# Patient Record
Sex: FEMALE | Race: WHITE | NOT HISPANIC OR LATINO | Employment: OTHER | URBAN - METROPOLITAN AREA
[De-identification: names, ages, dates, MRNs, and addresses within clinical notes are randomized per-mention and may not be internally consistent; named-entity substitution may affect disease eponyms.]

---

## 2017-11-20 LAB
ABO + RH BLD: NORMAL
ABO + RH BLD: NORMAL
BLD GP AB SCN SERPL QL: NORMAL
HBV SURFACE AG SERPL QL IA: NORMAL
HIV 1+2 AB+HIV1 P24 AG SERPL QL IA: NORMAL
RUBELLA ABY IGG: 1.93
RUBELLA ANTIBODY IGG QUANTITATIVE: NORMAL IU/ML
T PALLIDUM IGG SER QL: NORMAL

## 2018-05-18 ENCOUNTER — HOSPITAL ENCOUNTER (OUTPATIENT)
Facility: CLINIC | Age: 28
Discharge: HOME | End: 2018-05-18
Attending: SPECIALIST | Admitting: SPECIALIST
Payer: COMMERCIAL

## 2018-05-18 LAB
ALBUMIN UR-MCNC: NEGATIVE MG/DL
APPEARANCE UR: CLEAR
BACTERIA #/AREA URNS HPF: ABNORMAL /HPF
BILIRUB UR QL STRIP: NEGATIVE
COLOR UR AUTO: ABNORMAL
FIBRONECTIN FETAL VAG QL: NEGATIVE
GLUCOSE UR STRIP-MCNC: NEGATIVE MG/DL
HGB UR QL STRIP: NEGATIVE
KETONES UR STRIP-MCNC: NEGATIVE MG/DL
LEUKOCYTE ESTERASE UR QL STRIP: NEGATIVE
MUCOUS THREADS #/AREA URNS LPF: PRESENT /LPF
NITRATE UR QL: NEGATIVE
PH UR STRIP: 7 PH (ref 5–7)
RBC #/AREA URNS AUTO: 2 /HPF (ref 0–2)
SOURCE: ABNORMAL
SP GR UR STRIP: 1.01 (ref 1–1.03)
SQUAMOUS #/AREA URNS AUTO: 1 /HPF (ref 0–1)
UROBILINOGEN UR STRIP-MCNC: NORMAL MG/DL (ref 0–2)
WBC #/AREA URNS AUTO: 2 /HPF (ref 0–5)

## 2018-05-18 PROCEDURE — 59025 FETAL NON-STRESS TEST: CPT

## 2018-05-18 PROCEDURE — G0463 HOSPITAL OUTPT CLINIC VISIT: HCPCS | Mod: 25

## 2018-05-18 PROCEDURE — 81001 URINALYSIS AUTO W/SCOPE: CPT | Performed by: SPECIALIST

## 2018-05-18 PROCEDURE — 82731 ASSAY OF FETAL FIBRONECTIN: CPT | Performed by: SPECIALIST

## 2018-05-18 RX ORDER — ONDANSETRON 2 MG/ML
4 INJECTION INTRAMUSCULAR; INTRAVENOUS EVERY 6 HOURS PRN
Status: DISCONTINUED | OUTPATIENT
Start: 2018-05-18 | End: 2018-05-18 | Stop reason: HOSPADM

## 2018-05-18 RX ORDER — PRENATAL VIT/IRON FUM/FOLIC AC 27MG-0.8MG
1 TABLET ORAL DAILY
Status: ON HOLD | COMMUNITY
End: 2021-01-14

## 2018-05-18 NOTE — PROGRESS NOTES
1610: Pt presents to [facility] for  labor eval.  Consent given to place external monitors.  Telephone orders from Dr. Can for FFN, SVE, and UA.  1625: FFN collected and sent to lab.  1625: SVE closed, thick and high.  1652: UA collected and sent to lab.  1700: Pt states she had four episodes of sharp lower abdominal pain the car ride over, lasting a few seconds each. Pt states she is not feeling that same pain now that she is here. Reactive NST, occasional ctx noted on monitor.   1730:  Lab results back, FFN negative. Results relayed to Dr. Can. Telephone order to d/c pt home, to orally hydrate, and to rest, and to follow up if symptoms intensify.   1740: Pt agrees with plan of care. D/C home.

## 2018-05-18 NOTE — IP AVS SNAPSHOT
MRN:[id number]                      After Visit Summary   5/18/2018    Stefany Ricardo    MRN: [id number]           Thank you!     Thank you for choosing [place] for your care. Our goal is always to provide you with excellent care. Hearing back from our patients is one way we can continue to improve our services. Please take a few minutes to complete the written survey that you may receive in the mail after you visit with us. Thank you!        Patient Information     Date Of Birth          1990        About your hospital stay     You were admitted on:  May 18, 2018 You last received care in the:  [facility]    You were discharged on:  May 18, 2018       Who to Call     For medical emergencies, please call 911.  For non-urgent questions about your medical care, please call your primary care provider or clinic, None          Attending Provider     Provider Specialty    Yanet Can MD OB/Gyn       Primary Care Provider    None Specified      Further instructions from your care team       Discharge Instruction for Undelivered Patients      You were seen for: Labor Assessment  We Consulted: Dr. Can  You had (Test or Medicine): NST, fetal fibronectin, UA.     Diet:   Drink 8 to 12 glasses of liquids (milk, juice, water) every day.  You may eat meals and snacks.     Activity:  Call your doctor or nurse midwife if your baby is moving less than usual.     Call your provider if you notice:  Swelling in your face or increased swelling in your hands or legs.  Headaches that are not relieved by Tylenol (acetaminophen).  Changes in your vision (blurring: seeing spots or stars.)  Nausea (sick to your stomach) and vomiting (throwing up).   Weight gain of 5 pounds or more per week.  Heartburn that doesn't go away.  Signs of bladder infection: pain when you urinate (use the toilet), need to go more often and more urgently.  The bag of arredondo (rupture of membranes) breaks, or you notice  "leaking in your underwear.  Bright red blood in your underwear.  Abdominal (lower belly) or stomach pain.  For first baby: Contractions (tightening) less than 5 minutes apart for one hour or more.  *If less than 34 weeks: Contractions (tightenings) more than 6 times in one hour.  Increase or change in vaginal discharge (note the color and amount)    Follow-up:  As scheduled in the clinic, or if symptoms get worse.          Pending Results     No orders found from 2018 to 2018.            Admission Information     Date & Time Provider Department Dept. Phone    2018 Yanet Can MD [facility] [phone]      [facility] Information     [facility] lets you send messages to your doctor, view your test results, renew your prescriptions, schedule appointments and more. To sign up, go to [email or web address] . Click on \"Log in\" on the left side of the screen, which will take you to the Welcome page. Then click on \"Sign up Now\" on the right side of the page.     You will be asked to enter the access code listed below, as well as some personal information. Please follow the directions to create your username and password.     Your access code is: [email or web address]  Expires: 2018  5:41 PM     Your access code will  in 90 days. If you need help or a new code, please call your [place] clinic or [phone].        Care EveryWhere ID     This is your Care EveryWhere ID. This could be used by other organizations to access your [place] medical records  [id number]        Equal Access to Services     [facility]: Hadii aad ku hadashandriy Sodarlyn, waaxda luqadaha, qaybta kaalmada andrew, bj gonzales . So [facility] [phone].    ATENCIÓN: Si habla español, tiene a corrales disposición servicios gratuitos de asistencia lingüística. Llame al [phone].    We comply with applicable federal civil rights laws and [place] laws. We do not discriminate on the basis of " race, color, national origin, age, disability, sex, sexual orientation, or gender identity.               Review of your medicines      UNREVIEWED medicines. Ask your doctor about these medicines        Dose / Directions    prenatal multivitamin plus iron 27-0.8 MG Tabs per tablet        Dose:  1 tablet   Take 1 tablet by mouth daily   Refills:  0                Protect others around you: Learn how to safely use, store and throw away your medicines at www.disposemymeds.org.             Medication List: This is a list of all your medications and when to take them. Check marks below indicate your daily home schedule. Keep this list as a reference.      Medications           Morning Afternoon Evening Bedtime As Needed    prenatal multivitamin plus iron 27-0.8 MG Tabs per tablet   Take 1 tablet by mouth daily

## 2018-05-18 NOTE — DISCHARGE INSTRUCTIONS
Discharge Instruction for Undelivered Patients      You were seen for: Labor Assessment  We Consulted: Dr. Can  You had (Test or Medicine): NST, fetal fibronectin, UA.     Diet:   Drink 8 to 12 glasses of liquids (milk, juice, water) every day.  You may eat meals and snacks.     Activity:  Call your doctor or nurse midwife if your baby is moving less than usual.     Call your provider if you notice:  Swelling in your face or increased swelling in your hands or legs.  Headaches that are not relieved by Tylenol (acetaminophen).  Changes in your vision (blurring: seeing spots or stars.)  Nausea (sick to your stomach) and vomiting (throwing up).   Weight gain of 5 pounds or more per week.  Heartburn that doesn't go away.  Signs of bladder infection: pain when you urinate (use the toilet), need to go more often and more urgently.  The bag of arredondo (rupture of membranes) breaks, or you notice leaking in your underwear.  Bright red blood in your underwear.  Abdominal (lower belly) or stomach pain.  For first baby: Contractions (tightening) less than 5 minutes apart for one hour or more.  *If less than 34 weeks: Contractions (tightenings) more than 6 times in one hour.  Increase or change in vaginal discharge (note the color and amount)    Follow-up:  As scheduled in the clinic, or if symptoms get worse.

## 2018-05-18 NOTE — IP AVS SNAPSHOT
[address], [address]    [facility] [address]    Phone:  [phone]                                       After Visit Summary   5/18/2018    Stefany Ricardo    MRN: [id number]           After Visit Summary Signature Page     I have received my discharge instructions, and my questions have been answered. I have discussed any challenges I see with this plan with the nurse or doctor.    ..........................................................................................................................................  Patient/Patient Representative Signature      ..........................................................................................................................................  Patient Representative Print Name and Relationship to Patient    ..................................................               ................................................  Date                                            Time    ..........................................................................................................................................  Reviewed by Signature/Title    ...................................................              ..............................................  Date                                                            Time

## 2018-06-05 LAB — GROUP B STREP PCR: NORMAL

## 2018-06-22 ENCOUNTER — HOSPITAL ENCOUNTER (OUTPATIENT)
Facility: CLINIC | Age: 28
Discharge: HOME | End: 2018-06-22
Attending: SPECIALIST | Admitting: SPECIALIST
Payer: COMMERCIAL

## 2018-06-22 VITALS
RESPIRATION RATE: 16 BRPM | BODY MASS INDEX: 29.71 KG/M2 | DIASTOLIC BLOOD PRESSURE: 80 MMHG | HEART RATE: 90 BPM | SYSTOLIC BLOOD PRESSURE: 103 MMHG | WEIGHT: 174 LBS | TEMPERATURE: 99.4 F | HEIGHT: 64 IN

## 2018-06-22 LAB — RUPTURE OF FETAL MEMBRANES BY ROM PLUS: NEGATIVE

## 2018-06-22 PROCEDURE — 84112 EVAL AMNIOTIC FLUID PROTEIN: CPT | Performed by: SPECIALIST

## 2018-06-22 PROCEDURE — 59025 FETAL NON-STRESS TEST: CPT

## 2018-06-22 PROCEDURE — G0463 HOSPITAL OUTPT CLINIC VISIT: HCPCS | Mod: 25

## 2018-06-22 PROCEDURE — G0463 HOSPITAL OUTPT CLINIC VISIT: HCPCS

## 2018-06-22 RX ORDER — CALCIUM POLYCARBOPHIL 625 MG 625 MG/1
2 TABLET ORAL DAILY
COMMUNITY
End: 2020-05-20

## 2018-06-22 NOTE — IP AVS SNAPSHOT
MRN:[id number]                      After Visit Summary   6/22/2018    Stefany Ricadro    MRN: [id number]           Thank you!     Thank you for choosing [facility] for your care. Our goal is always to provide you with excellent care. Hearing back from our patients is one way we can continue to improve our services. Please take a few minutes to complete the written survey that you may receive in the mail after you visit with us. Thank you!        Patient Information     Date Of Birth          1990        About your hospital stay     You were admitted on:  June 22, 2018 You [facility] received care in the:   [facility]    You were discharged on:  June 22, 2018       Who to Call     For medical emergencies, please call 911.  For non-urgent questions about your medical care, please call your primary care provider or clinic, [phone]          Attending Provider     Provider Peggy Almazan MD OB/Gyn       Primary Care Provider Office Phone # Fax #    Yanet Michell Can [phone]      Further instructions from your care team       Discharge Instruction for Undelivered Patients      You were seen for: Membrane Assessment  We Consulted: Dr. Santos  You had (Test or Medicine):ROM test, Results Not Ruptured     Diet:   Drink 8 to 12 glasses of liquids (milk, juice, water) every day.  You may eat meals and snacks.     Activity:  Count fetal kicks everyday (see handout)  Call your doctor or nurse midwife if your baby is moving less than usual.     Call your provider if you notice:  Swelling in your face or increased swelling in your hands or legs.  Headaches that are not relieved by Tylenol (acetaminophen).  Changes in your vision (blurring: seeing spots or stars.)  Nausea (sick to your stomach) and vomiting (throwing up).   Weight gain of 5 pounds or more per week.  Heartburn that doesn't go away.  Signs of bladder infection: pain when you urinate (use the  "toilet), need to go more often and more urgently.  The bag of arredondo (rupture of membranes) breaks, or you notice leaking in your underwear.  Bright red blood in your underwear.  Abdominal (lower belly) or stomach pain.  For first baby: Contractions (tightening) less than 5 minutes apart for one hour or more.  Increase or change in vaginal discharge (note the color and amount)    Follow-up:  As scheduled in the clinic          Pending Results     No orders found from 2018 to 2018.            Admission Information     Date & Time Provider Department Dept. Phone    2018 Peggy Santos MD  [facility] [phone]      Your Vitals Were     Blood Pressure Pulse Temperature Respirations Height Weight    103/80 90 99.4  F (37.4  C) (Oral) 16 1.626 m (5' 4\") 78.9 kg (174 lb)    BMI (Body Mass Index)                   29.87 kg/m2           MyChart Information     [employer] lets you send messages to your doctor, view your test results, renew your prescriptions, schedule appointments and more. To sign up, go to [email or web address] . Click on \"Log in\" on the left side of the screen, which will take you to the Welcome page. Then click on \"Sign up Now\" on the right side of the page.     You will be asked to enter the access code listed below, as well as some personal information. Please follow the directions to create your username and password.     Your access code is: [email or web address]  Expires: 2018  5:41 PM     Your access code will  in 90 days. If you need help or a new code, please call your [place] clinic or [phone].        Care EveryWhere ID     This is your Care EveryWhere ID. This could be used by other organizations to access your [place] medical records  [id number]        Equal Access to Services     [facility] : Jamie Man, ania henderson, bj dooley. So [facility] [phone].    ATENCIÓN: Si " trey castro, tiene a corrales disposición servicios gratuitos de asistencia lingüística. Khai ennis [phone].    We comply with applicable federal civil rights laws and [place] laws. We do not discriminate on the basis of race, color, national origin, age, disability, sex, sexual orientation, or gender identity.               Review of your medicines      UNREVIEWED medicines. Ask your doctor about these medicines        Dose / Directions    calcium polycarbophil 625 MG tablet   Commonly known as:  FIBERCON        Dose:  2 tablet   Take 2 tablets by mouth daily   Refills:  0       prenatal multivitamin plus iron 27-0.8 MG Tabs per tablet        Dose:  1 tablet   Take 1 tablet by mouth daily   Refills:  0       VITAMIN D (CHOLECALCIFEROL) PO        Dose:  5000 Units   Take 5,000 Units by mouth daily   Refills:  0                Protect others around you: Learn how to safely use, store and throw away your medicines at www.disposemymeds.org.             Medication List: This is a list of all your medications and when to take them. Check marks below indicate your daily home schedule. Keep this list as a reference.      Medications           Morning Afternoon Evening Bedtime As Needed    calcium polycarbophil 625 MG tablet   Commonly known as:  FIBERCON   Take 2 tablets by mouth daily                                prenatal multivitamin plus iron 27-0.8 MG Tabs per tablet   Take 1 tablet by mouth daily                                VITAMIN D (CHOLECALCIFEROL) PO   Take 5,000 Units by mouth daily

## 2018-06-22 NOTE — DISCHARGE INSTRUCTIONS
Discharge Instruction for Undelivered Patients      You were seen for: Membrane Assessment  We Consulted: Dr. Santos  You had (Test or Medicine):ROM test, Results Not Ruptured     Diet:   Drink 8 to 12 glasses of liquids (milk, juice, water) every day.  You may eat meals and snacks.     Activity:  Count fetal kicks everyday (see handout)  Call your doctor or nurse midwife if your baby is moving less than usual.     Call your provider if you notice:  Swelling in your face or increased swelling in your hands or legs.  Headaches that are not relieved by Tylenol (acetaminophen).  Changes in your vision (blurring: seeing spots or stars.)  Nausea (sick to your stomach) and vomiting (throwing up).   Weight gain of 5 pounds or more per week.  Heartburn that doesn't go away.  Signs of bladder infection: pain when you urinate (use the toilet), need to go more often and more urgently.  The bag of arredondo (rupture of membranes) breaks, or you notice leaking in your underwear.  Bright red blood in your underwear.  Abdominal (lower belly) or stomach pain.  For first baby: Contractions (tightening) less than 5 minutes apart for one hour or more.  Increase or change in vaginal discharge (note the color and amount)    Follow-up:  As scheduled in the clinic

## 2018-06-22 NOTE — PLAN OF CARE
At arrived to Maternal assessment center at 1245 and put into room 229. Zena and Us applied. Assessment started. Pt stated has been feeling wet vaginally at times but mo large gushes.  No blood noted.  Pt states feels baby move regularly.  At 1310 - 1340 NST done.  ROM test came back negative.  Pt informed of results.  SVE done at this time as well. At 1420 Dr. Santos called and updated on maternal status and fht's and ROm test being negative.  Pt at [facility] was discharged.  All Discharge instructions gone over with patient.

## 2018-06-22 NOTE — IP AVS SNAPSHOT
[facility]    [address] [place] [address], [address]    [facility] [address]    Phone:  [phone]                                       After Visit Summary   6/22/2018    Stefany Ricardo    MRN: [id number]           After Visit Summary Signature Page     I have received my discharge instructions, and my questions have been answered. I have discussed any challenges I see with this plan with the nurse or doctor.    ..........................................................................................................................................  Patient/Patient Representative Signature      ..........................................................................................................................................  Patient Representative Print Name and Relationship to Patient    ..................................................               ................................................  Date                                            Time    ..........................................................................................................................................  Reviewed by Signature/Title    ...................................................              ..............................................  Date                                                            Time

## 2018-07-06 ENCOUNTER — HOSPITAL ENCOUNTER (OUTPATIENT)
Facility: CLINIC | Age: 28
Setting detail: OBSERVATION
Discharge: HOME | End: 2018-07-07
Attending: SPECIALIST | Admitting: SPECIALIST
Payer: COMMERCIAL

## 2018-07-06 LAB
ALT SERPL W P-5'-P-CCNC: 15 U/L (ref 0–50)
AST SERPL W P-5'-P-CCNC: 16 U/L (ref 0–45)
CREAT SERPL-MCNC: 0.7 MG/DL (ref 0.52–1.04)
ERYTHROCYTE [DISTWIDTH] IN BLOOD BY AUTOMATED COUNT: 13 % (ref 10–15)
GFR SERPL CREATININE-BSD FRML MDRD: >90 ML/MIN/1.7M2
HCT VFR BLD AUTO: 38.1 % (ref 35–47)
HGB BLD-MCNC: 13.5 G/DL (ref 11.7–15.7)
MCH RBC QN AUTO: 31 PG (ref 26.5–33)
MCHC RBC AUTO-ENTMCNC: 35.4 G/DL (ref 31.5–36.5)
MCV RBC AUTO: 87 FL (ref 78–100)
PLATELET # BLD AUTO: 175 10E9/L (ref 150–450)
RBC # BLD AUTO: 4.36 10E12/L (ref 3.8–5.2)
URATE SERPL-MCNC: 4.4 MG/DL (ref 2.6–6)
WBC # BLD AUTO: 15.4 10E9/L (ref 4–11)

## 2018-07-06 PROCEDURE — 25000132 ZZH RX MED GY IP 250 OP 250 PS 637: Performed by: SPECIALIST

## 2018-07-06 PROCEDURE — 36415 COLL VENOUS BLD VENIPUNCTURE: CPT | Performed by: SPECIALIST

## 2018-07-06 PROCEDURE — G0378 HOSPITAL OBSERVATION PER HR: HCPCS

## 2018-07-06 PROCEDURE — 85027 COMPLETE CBC AUTOMATED: CPT | Performed by: SPECIALIST

## 2018-07-06 PROCEDURE — 25000128 H RX IP 250 OP 636: Performed by: SPECIALIST

## 2018-07-06 PROCEDURE — G0463 HOSPITAL OUTPT CLINIC VISIT: HCPCS | Mod: 25

## 2018-07-06 PROCEDURE — 82565 ASSAY OF CREATININE: CPT | Performed by: SPECIALIST

## 2018-07-06 PROCEDURE — 84450 TRANSFERASE (AST) (SGOT): CPT | Performed by: SPECIALIST

## 2018-07-06 PROCEDURE — 86780 TREPONEMA PALLIDUM: CPT | Performed by: SPECIALIST

## 2018-07-06 PROCEDURE — 59025 FETAL NON-STRESS TEST: CPT

## 2018-07-06 PROCEDURE — 96372 THER/PROPH/DIAG INJ SC/IM: CPT

## 2018-07-06 PROCEDURE — 84460 ALANINE AMINO (ALT) (SGPT): CPT | Performed by: SPECIALIST

## 2018-07-06 PROCEDURE — 84550 ASSAY OF BLOOD/URIC ACID: CPT | Performed by: SPECIALIST

## 2018-07-06 RX ORDER — ONDANSETRON 2 MG/ML
4 INJECTION INTRAMUSCULAR; INTRAVENOUS EVERY 6 HOURS PRN
Status: DISCONTINUED | OUTPATIENT
Start: 2018-07-06 | End: 2018-07-07 | Stop reason: HOSPADM

## 2018-07-06 RX ORDER — HYDROXYZINE HYDROCHLORIDE 50 MG/1
100 TABLET, FILM COATED ORAL ONCE
Status: COMPLETED | OUTPATIENT
Start: 2018-07-06 | End: 2018-07-06

## 2018-07-06 RX ORDER — MORPHINE SULFATE 10 MG/ML
10 INJECTION, SOLUTION INTRAMUSCULAR; INTRAVENOUS ONCE
Status: COMPLETED | OUTPATIENT
Start: 2018-07-06 | End: 2018-07-06

## 2018-07-06 RX ADMIN — HYDROXYZINE HYDROCHLORIDE 100 MG: 50 TABLET, FILM COATED ORAL at 23:33

## 2018-07-06 RX ADMIN — MORPHINE SULFATE 10 MG: 10 INJECTION, SOLUTION INTRAMUSCULAR; INTRAVENOUS at 23:33

## 2018-07-06 NOTE — IP AVS SNAPSHOT
MRN:[id number]                      After Visit Summary   7/6/2018    Stefany Ricardo    MRN: [id number]           Thank you!     Thank you for choosing [facility] for your care. Our goal is always to provide you with excellent care. Hearing back from our patients is one way we can continue to improve our services. Please take a few minutes to complete the written survey that you may receive in the mail after you visit with us. Thank you!        Patient Information     Date Of Birth          1990        About your hospital stay     You were admitted on:  July 6, 2018 You last received care in the:  [facility] Labor [facility] Delivery    You were discharged on:  July 7, 2018       Who to Call     For medical emergencies, please call 911.  For non-urgent questions about your medical care, please call your primary care provider or clinic, [phone]          Attending Provider     Provider Specialty    Geraldine Diane MD OB/Gyn       Primary Care Provider Office Phone # Fax #    Yanet Michell Can [phone]      Further instructions from your care team       Discharge Instruction for Undelivered Patients      You were seen for: Labor Assessment  We Consulted: Dr. Diane  You had (Test or Medicine): Non Stress Test, Lab work, Vistaril, Morphine     Diet:   Drink 8 to 12 glasses of liquids (milk, juice, water) every day.  You may eat meals and snacks.     Activity:  Count fetal kicks everyday (see handout)  Call your doctor or nurse midwife if your baby is moving less than usual.     Call your provider if you notice:  Swelling in your face or increased swelling in your hands or legs.  Headaches that are not relieved by Tylenol (acetaminophen).  Changes in your vision (blurring: seeing spots or stars.)  Nausea (sick to your stomach) and vomiting (throwing up).   Weight gain of 5 pounds or more per week.  Heartburn that doesn't go away.  Signs of bladder infection: pain  "when you urinate (use the toilet), need to go more often and more urgently.  The bag of arredondo (rupture of membranes) breaks, or you notice leaking in your underwear.  Bright red blood in your underwear.  Abdominal (lower belly) or stomach pain.  For first baby: Contractions (tightening) less than 5 minutes apart for one hour or more.  Second (plus) baby: Contractions (tightening) less than 10 minutes apart and getting stronger.  *If less than 34 weeks: Contractions (tightenings) more than 6 times in one hour.  Increase or change in vaginal discharge (note the color and amount)      Follow-up:  As scheduled in the clinic          Pending Results     Date and Time Order Name Status Description    2018 2259 Treponema Abs w Reflex to RPR and Titer In process             Admission Information     Date & Time Provider Department Dept. Phone    2018 Geraldine Diane MD [facility] Labor and Delivery [phone]      Your Vitals Were     Blood Pressure Temperature Respirations             120/82 99.3  F (37.4  C) 16         MyChart Information     [facility] [employer] lets you send messages to your doctor, view your test results, renew your prescriptions, schedule appointments and more. To sign up, go to [email or web address] . Click on \"Log in\" on the left side of the screen, which will take you to the Welcome page. Then click on \"Sign up Now\" on the right side of the page.     You will be asked to enter the access code listed below, as well as some personal information. Please follow the directions to create your username and password.     Your access code is: [email or web address]  Expires: 2018  5:41 PM     Your access code will  in 90 days. If you need help or a new code, please call your [place] clinic or [phone].        Care EveryWhere ID     This is your Care EveryWhere ID. This could be used by other organizations to access your [place] medical records  [id number]        Equal Access to " Services     [facility]: Hadii kingsley Man, waaxda luqadaha, qaybta kaalmada andrew, bj gonzales . So [facility] [phone].    ATENCIÓN: Si habla español, tiene a corrales disposición servicios gratuitos de asistencia lingüística. Llame al [phone].    We comply with applicable federal civil rights laws and [place] laws. We do not discriminate on the basis of race, color, national origin, age, disability, sex, sexual orientation, or gender identity.               Review of your medicines      UNREVIEWED medicines. Ask your doctor about these medicines        Dose / Directions    calcium polycarbophil 625 MG tablet   Commonly known as:  FIBERCON        Dose:  2 tablet   Take 2 tablets by mouth daily   Refills:  0       prenatal multivitamin plus iron 27-0.8 MG Tabs per tablet        Dose:  1 tablet   Take 1 tablet by mouth daily   Refills:  0       VITAMIN D (CHOLECALCIFEROL) PO        Dose:  5000 Units   Take 5,000 Units by mouth daily   Refills:  0                Protect others around you: Learn how to safely use, store and throw away your medicines at www.disposemymeds.org.             Medication List: This is a list of all your medications and when to take them. Check marks below indicate your daily home schedule. Keep this list as a reference.      Medications           Morning Afternoon Evening Bedtime As Needed    calcium polycarbophil 625 MG tablet   Commonly known as:  FIBERCON   Take 2 tablets by mouth daily                                prenatal multivitamin plus iron 27-0.8 MG Tabs per tablet   Take 1 tablet by mouth daily                                VITAMIN D (CHOLECALCIFEROL) PO   Take 5,000 Units by mouth daily

## 2018-07-06 NOTE — IP AVS SNAPSHOT
[facility] Labor and Delivery    [address] [place] [address]    Phone:  [phone]                                       After Visit Summary   7/6/2018    Stefany Ricardo    MRN: [id number]           After Visit Summary Signature Page     I have received my discharge instructions, and my questions have been answered. I have discussed any challenges I see with this plan with the nurse or doctor.    ..........................................................................................................................................  Patient/Patient Representative Signature      ..........................................................................................................................................  Patient Representative Print Name and Relationship to Patient    ..................................................               ................................................  Date                                            Time    ..........................................................................................................................................  Reviewed by Signature/Title    ...................................................              ..............................................  Date                                                            Time

## 2018-07-07 ENCOUNTER — HOSPITAL ENCOUNTER (INPATIENT)
Facility: CLINIC | Age: 28
LOS: 3 days | Discharge: HOME | End: 2018-07-10
Attending: SPECIALIST | Admitting: SPECIALIST
Payer: COMMERCIAL

## 2018-07-07 ENCOUNTER — ANESTHESIA (OUTPATIENT)
Dept: OBGYN | Facility: CLINIC | Age: 28
End: 2018-07-07
Payer: COMMERCIAL

## 2018-07-07 ENCOUNTER — ANESTHESIA EVENT (OUTPATIENT)
Dept: OBGYN | Facility: CLINIC | Age: 28
End: 2018-07-07
Payer: COMMERCIAL

## 2018-07-07 VITALS — DIASTOLIC BLOOD PRESSURE: 86 MMHG | RESPIRATION RATE: 16 BRPM | TEMPERATURE: 99.3 F | SYSTOLIC BLOOD PRESSURE: 127 MMHG

## 2018-07-07 DIAGNOSIS — F41.9 ANXIETY: Primary | ICD-10-CM

## 2018-07-07 LAB
ABO + RH BLD: NORMAL
ABO + RH BLD: NORMAL
RUPTURE OF FETAL MEMBRANES BY ROM PLUS: POSITIVE
SPECIMEN EXP DATE BLD: NORMAL
T PALLIDUM AB SER QL: NONREACTIVE

## 2018-07-07 PROCEDURE — 3E0R3BZ INTRODUCTION OF ANESTHETIC AGENT INTO SPINAL CANAL, PERCUTANEOUS APPROACH: ICD-10-PCS | Performed by: ANESTHESIOLOGY

## 2018-07-07 PROCEDURE — 25000125 ZZHC RX 250: Performed by: SPECIALIST

## 2018-07-07 PROCEDURE — 27110038 ZZH RX 271: Performed by: ANESTHESIOLOGY

## 2018-07-07 PROCEDURE — 25000125 ZZHC RX 250: Performed by: ANESTHESIOLOGY

## 2018-07-07 PROCEDURE — 12000029 ZZH R&B OB INTERMEDIATE

## 2018-07-07 PROCEDURE — 86901 BLOOD TYPING SEROLOGIC RH(D): CPT | Performed by: SPECIALIST

## 2018-07-07 PROCEDURE — G0378 HOSPITAL OBSERVATION PER HR: HCPCS

## 2018-07-07 PROCEDURE — 37000011 ZZH ANESTHESIA WARD SERVICE

## 2018-07-07 PROCEDURE — G0463 HOSPITAL OUTPT CLINIC VISIT: HCPCS

## 2018-07-07 PROCEDURE — 25000128 H RX IP 250 OP 636: Performed by: ANESTHESIOLOGY

## 2018-07-07 PROCEDURE — 59025 FETAL NON-STRESS TEST: CPT

## 2018-07-07 PROCEDURE — 86780 TREPONEMA PALLIDUM: CPT | Performed by: SPECIALIST

## 2018-07-07 PROCEDURE — 86900 BLOOD TYPING SEROLOGIC ABO: CPT | Performed by: SPECIALIST

## 2018-07-07 PROCEDURE — 36415 COLL VENOUS BLD VENIPUNCTURE: CPT | Performed by: SPECIALIST

## 2018-07-07 PROCEDURE — 84112 EVAL AMNIOTIC FLUID PROTEIN: CPT | Performed by: SPECIALIST

## 2018-07-07 PROCEDURE — 00HU33Z INSERTION OF INFUSION DEVICE INTO SPINAL CANAL, PERCUTANEOUS APPROACH: ICD-10-PCS | Performed by: ANESTHESIOLOGY

## 2018-07-07 PROCEDURE — 25000128 H RX IP 250 OP 636: Performed by: SPECIALIST

## 2018-07-07 RX ORDER — LIDOCAINE HYDROCHLORIDE AND EPINEPHRINE 15; 5 MG/ML; UG/ML
3 INJECTION, SOLUTION EPIDURAL
Status: COMPLETED | OUTPATIENT
Start: 2018-07-07 | End: 2018-07-07

## 2018-07-07 RX ORDER — OXYTOCIN/0.9 % SODIUM CHLORIDE 30/500 ML
1-24 PLASTIC BAG, INJECTION (ML) INTRAVENOUS CONTINUOUS
Status: DISCONTINUED | OUTPATIENT
Start: 2018-07-07 | End: 2018-07-08

## 2018-07-07 RX ORDER — LIDOCAINE 40 MG/G
CREAM TOPICAL
Status: DISCONTINUED | OUTPATIENT
Start: 2018-07-07 | End: 2018-07-08

## 2018-07-07 RX ORDER — OXYTOCIN 10 [USP'U]/ML
10 INJECTION, SOLUTION INTRAMUSCULAR; INTRAVENOUS
Status: DISCONTINUED | OUTPATIENT
Start: 2018-07-07 | End: 2018-07-08

## 2018-07-07 RX ORDER — METHYLERGONOVINE MALEATE 0.2 MG/ML
200 INJECTION INTRAVENOUS
Status: DISCONTINUED | OUTPATIENT
Start: 2018-07-07 | End: 2018-07-08

## 2018-07-07 RX ORDER — OXYTOCIN/0.9 % SODIUM CHLORIDE 30/500 ML
100-340 PLASTIC BAG, INJECTION (ML) INTRAVENOUS CONTINUOUS PRN
Status: COMPLETED | OUTPATIENT
Start: 2018-07-07 | End: 2018-07-08

## 2018-07-07 RX ORDER — NALBUPHINE HYDROCHLORIDE 10 MG/ML
2.5-5 INJECTION, SOLUTION INTRAMUSCULAR; INTRAVENOUS; SUBCUTANEOUS EVERY 6 HOURS PRN
Status: DISCONTINUED | OUTPATIENT
Start: 2018-07-07 | End: 2018-07-08

## 2018-07-07 RX ORDER — OXYCODONE AND ACETAMINOPHEN 5; 325 MG/1; MG/1
1 TABLET ORAL
Status: DISCONTINUED | OUTPATIENT
Start: 2018-07-07 | End: 2018-07-08

## 2018-07-07 RX ORDER — ROPIVACAINE HYDROCHLORIDE 2 MG/ML
10 INJECTION, SOLUTION EPIDURAL; INFILTRATION; PERINEURAL ONCE
Status: COMPLETED | OUTPATIENT
Start: 2018-07-07 | End: 2018-07-07

## 2018-07-07 RX ORDER — ACETAMINOPHEN 325 MG/1
650 TABLET ORAL EVERY 4 HOURS PRN
Status: DISCONTINUED | OUTPATIENT
Start: 2018-07-07 | End: 2018-07-08

## 2018-07-07 RX ORDER — SODIUM CHLORIDE, SODIUM LACTATE, POTASSIUM CHLORIDE, CALCIUM CHLORIDE 600; 310; 30; 20 MG/100ML; MG/100ML; MG/100ML; MG/100ML
INJECTION, SOLUTION INTRAVENOUS CONTINUOUS
Status: DISCONTINUED | OUTPATIENT
Start: 2018-07-07 | End: 2018-07-08

## 2018-07-07 RX ORDER — EPHEDRINE SULFATE 50 MG/ML
5 INJECTION, SOLUTION INTRAMUSCULAR; INTRAVENOUS; SUBCUTANEOUS
Status: DISCONTINUED | OUTPATIENT
Start: 2018-07-07 | End: 2018-07-08

## 2018-07-07 RX ORDER — ONDANSETRON 2 MG/ML
4 INJECTION INTRAMUSCULAR; INTRAVENOUS EVERY 6 HOURS PRN
Status: DISCONTINUED | OUTPATIENT
Start: 2018-07-07 | End: 2018-07-08

## 2018-07-07 RX ORDER — NALOXONE HYDROCHLORIDE 0.4 MG/ML
.1-.4 INJECTION, SOLUTION INTRAMUSCULAR; INTRAVENOUS; SUBCUTANEOUS
Status: DISCONTINUED | OUTPATIENT
Start: 2018-07-07 | End: 2018-07-08

## 2018-07-07 RX ORDER — CARBOPROST TROMETHAMINE 250 UG/ML
250 INJECTION, SOLUTION INTRAMUSCULAR
Status: DISCONTINUED | OUTPATIENT
Start: 2018-07-07 | End: 2018-07-08

## 2018-07-07 RX ORDER — IBUPROFEN 400 MG/1
800 TABLET, FILM COATED ORAL
Status: DISCONTINUED | OUTPATIENT
Start: 2018-07-07 | End: 2018-07-08

## 2018-07-07 RX ADMIN — LIDOCAINE HYDROCHLORIDE AND EPINEPHRINE 3 ML: 15; 5 INJECTION, SOLUTION EPIDURAL at 22:40

## 2018-07-07 RX ADMIN — OXYTOCIN-SODIUM CHLORIDE 0.9% IV SOLN 30 UNIT/500ML 2 MILLI-UNITS/MIN: 30-0.9/5 SOLUTION at 22:50

## 2018-07-07 RX ADMIN — ROPIVACAINE HYDROCHLORIDE 10 ML: 2 INJECTION, SOLUTION EPIDURAL; INFILTRATION at 22:40

## 2018-07-07 RX ADMIN — Medication 12 ML/HR: at 22:45

## 2018-07-07 RX ADMIN — SODIUM CHLORIDE, POTASSIUM CHLORIDE, SODIUM LACTATE AND CALCIUM CHLORIDE 1000 ML: 600; 310; 30; 20 INJECTION, SOLUTION INTRAVENOUS at 22:00

## 2018-07-07 RX ADMIN — SODIUM CHLORIDE, POTASSIUM CHLORIDE, SODIUM LACTATE AND CALCIUM CHLORIDE: 600; 310; 30; 20 INJECTION, SOLUTION INTRAVENOUS at 22:44

## 2018-07-07 NOTE — DISCHARGE INSTRUCTIONS
Discharge Instruction for Undelivered Patients      You were seen for: Labor Assessment  We Consulted: Dr. Diane  You had (Test or Medicine): Non Stress Test, Lab work, Vistaril, Morphine     Diet:   Drink 8 to 12 glasses of liquids (milk, juice, water) every day.  You may eat meals and snacks.     Activity:  Count fetal kicks everyday (see handout)  Call your doctor or nurse midwife if your baby is moving less than usual.     Call your provider if you notice:  Swelling in your face or increased swelling in your hands or legs.  Headaches that are not relieved by Tylenol (acetaminophen).  Changes in your vision (blurring: seeing spots or stars.)  Nausea (sick to your stomach) and vomiting (throwing up).   Weight gain of 5 pounds or more per week.  Heartburn that doesn't go away.  Signs of bladder infection: pain when you urinate (use the toilet), need to go more often and more urgently.  The bag of arredondo (rupture of membranes) breaks, or you notice leaking in your underwear.  Bright red blood in your underwear.  Abdominal (lower belly) or stomach pain.  For first baby: Contractions (tightening) less than 5 minutes apart for one hour or more.  Second (plus) baby: Contractions (tightening) less than 10 minutes apart and getting stronger.  *If less than 34 weeks: Contractions (tightenings) more than 6 times in one hour.  Increase or change in vaginal discharge (note the color and amount)      Follow-up:  As scheduled in the clinic

## 2018-07-07 NOTE — IP AVS SNAPSHOT
MRN:[id number]                      After Visit Summary   7/7/2018    Stefany Ricardo    MRN: [id number]           Thank you!     Thank you for choosing [facility] for your care. Our goal is always to provide you with excellent care. Hearing back from our patients is one way we can continue to improve our services. Please take a few minutes to complete the written survey that you may receive in the mail after you visit with us. Thank you!        Patient Information     Date Of Birth          1990        Designated Caregiver       Most Recent Value    Caregiver    Name of designated caregiver Peter Ricardo    Phone number of caregiver       About your hospital stay     You were admitted on:  July 7, 2018 You last received care in the:  [address]    You were discharged on:  July 10, 2018        Reason for your hospital stay       Maternity care                  Who to Call     For medical emergencies, please call 911.  For non-urgent questions about your medical care, please call your primary care provider or clinic, [phone]          Attending Provider     Provider Specialty    Geraldine Diane MD OB/Gyn       Primary Care Provider Office Phone # Fax #    Yanet Michell Can [phone]      After Care Instructions     Activity       Review discharge instructions            Diet       Resume previous diet            Discharge Instructions - Postpartum visit       Schedule postpartum visit with your provider and return to clinic in 6 weeks.  Please call clinic on Friday 7/13 with at home blood pressure reading.                  Follow-up Appointments     Follow Up and recommended labs and tests       Call [facility] on Friday 7/13 with blood pressure.  Follow-up in clinic in 6 weeks.                  Further instructions from your care team       Postpartum Vaginal Delivery Instructions    Activity       Ask  family and friends for help when you need it.    Do not place anything in your vagina for 6 weeks.    You are not restricted on other activities, but take it easy for a few weeks to allow your body to recover from delivery.  You are able to do any activities you feel up to that point.    No driving until you have stopped taking your pain medications (usually two weeks after delivery).     Call your health care provider if you have any of these symptoms:       Increased pain, swelling, redness, or fluid around your stiches from an episiotomy or perineal tear.    A fever above 100.4 F (38 C) with or without chills when placing a thermometer under your tongue.    You soak a sanitary pad with blood within 1 hour, or you see blood clots larger than a golf ball.    Bleeding that lasts more than 6 weeks.    Vaginal discharge that smells bad.    Severe pain, cramping or tenderness in your lower belly area.    A need to urinate more frequently (use the toilet more often), more urgently (use the toilet very quickly), or it burns when you urinate.    Nausea and vomiting.    Redness, swelling or pain around a vein in your leg.    Problems breastfeeding or a red or painful area on your breast.    Chest pain and cough or are gasping for air.    Problems coping with sadness, anxiety, or depression.  If you have any concerns about hurting yourself or the baby, call your provider immediately.     You have questions or concerns after you return home.     Keep your hands clean:  Always wash your hands before touching your perineal area and stitches.  This helps reduce your risk of infection.  If your hands aren't dirty, you may use an alcohol hand-rub to clean your hands. Keep your nails clean and short.        Pending Results     No orders found from 7/5/2018 to 7/8/2018.            Statement of Approval     Ordered          07/10/18 0731  I have reviewed and agree with all the recommendations and orders detailed in this document.   "EFFECTIVE NOW     Approved and electronically signed by:  Stefany Lebron APRN CNP             Admission Information     Date & Time Provider Department Dept. Phone    2018 Geraldine Diane MD [address] [phone]      Your Vitals Were     Blood Pressure Pulse Temperature Respirations Pulse Oximetry       123/86 75 98.5  F (36.9  C) (Oral) 16 96%       MyChart Information     [facility] lets you send messages to your doctor, view your test results, renew your prescriptions, schedule appointments and more. To sign up, go to [email or web address] . Click on \"Log in\" on the left side of the screen, which will take you to the Welcome page. Then click on \"Sign up Now\" on the right side of the page.     You will be asked to enter the access code listed below, as well as some personal information. Please follow the directions to create your username and password.     Your access code is: [email or web address]  Expires: 2018  5:41 PM     Your access code will  in 90 days. If you need help or a new code, please call your [facility] clinic or [phone].        Care EveryWhere ID     This is your Care EveryWhere ID. This could be used by other organizations to access your [facility] medical records  [id number]        Equal Access to Services     MISTY GOLDBERG : Hadii kingsley quevedo hadsebastiáno Sodarlyn, waaxda luqadaha, qaybta kaalmada bj morales. So [facility] [phone].    ATENCIÓN: Si habla español, tiene a corrales disposición servicios gratuitos de asistencia lingüística. Khai al [phone].    We comply with applicable federal civil rights laws and [place] laws. We do not discriminate on the basis of race, color, national origin, age, disability, sex, sexual orientation, or gender identity.               Review of your medicines      START taking        Dose / Directions    buPROPion 150 MG 24 hr tablet   Commonly known as:  WELLBUTRIN XL   Used " for:  Anxiety        Dose:  150 mg   Take 1 tablet (150 mg) by mouth daily   Quantity:  90 tablet   Refills:  3         CONTINUE these medicines which have NOT CHANGED        Dose / Directions    calcium polycarbophil 625 MG tablet   Commonly known as:  FIBERCON        Dose:  2 tablet   Take 2 tablets by mouth daily   Refills:  0       prenatal multivitamin plus iron 27-0.8 MG Tabs per tablet        Dose:  1 tablet   Take 1 tablet by mouth daily   Refills:  0       VITAMIN D (CHOLECALCIFEROL) PO        Dose:  5000 Units   Take 5,000 Units by mouth daily   Refills:  0            Where to get your medicines      These medications were sent to [facility] - [address]  [address], [place] [address]     Phone:  [phone]     buPROPion 150 MG 24 hr tablet                Protect others around you: Learn how to safely use, store and throw away your medicines at www.disposemymeds.org.             Medication List: This is a list of all your medications and when to take them. Check marks below indicate your daily home schedule. Keep this list as a reference.      Medications           Morning Afternoon Evening Bedtime As Needed    buPROPion 150 MG 24 hr tablet   Commonly known as:  WELLBUTRIN XL   Take 1 tablet (150 mg) by mouth daily   Last time this was given:  150 mg on 7/10/2018  7:52 AM                                calcium polycarbophil 625 MG tablet   Commonly known as:  FIBERCON   Take 2 tablets by mouth daily                                prenatal multivitamin plus iron 27-0.8 MG Tabs per tablet   Take 1 tablet by mouth daily                                VITAMIN D (CHOLECALCIFEROL) PO   Take 5,000 Units by mouth daily                                          More Information        After a Vaginal Birth    After having a baby, your body may be very tired. It can take time to recover from a vaginal delivery. You may stay in the hospital or birth center from 1 to 4  days. In some cases, you may be able to go home the same day.  Right after the delivery  Your temperature and blood pressure will be taken until they are stable. A nurse or other healthcare provider will observe you as you rest. You may have afterbirth pains. These are cramps caused by the uterus shrinking. Sanitary pads are used to soak up the discharge of the uterine lining. To make sure that you aren t bleeding too much, the pad will be checked. And the firmness of your uterus will be checked. To do this, a nurse will gently push down on your stomach. If you had anesthesia, you ll be watched closely until you can feel and move your toes. If you have perineal pain (pain between the vagina and anus), an ice pack can help.  [place] care  While still in the hospital or birth center, you ll learn how to hold and feed your baby. You will also be given instructions on how to care for your baby. This includes bathing and feeding.   Preparing to go home  You may be anxious to go home as soon as possible. Before you and your baby go home, a healthcare provider will check to be sure you are healthy enough to take care of your baby and yourself. You re ready to go home when:    You can walk to the bathroom and use the bathroom without help.    You can eat solid food and swallow pills (if needed).    You have no sign of infection or other health problems, including fever.     You have adequate pain control.    Your bleeding isn't excessive.    You are able to care for your  and are emotionally stable.  Before leaving the hospital or birth center, you ll be given written instructions for home self-care after vaginal delivery. Be sure to follow these instructions carefully. If you have questions or concerns, talk about them now.  If you have stitches  You may have received stitches in the skin near your vagina. The stitches might have closed an episiotomy (an incision that enlarges the opening of the vagina). Or you may  have needed stitches to repair torn skin. Either way, your stitches should dissolve within weeks. Until then, you can help reduce discomfort, aid healing, and reduce your risk of infection by keeping the stitches clean. These tips can help:    Gently wipe from front to back after you urinate or have a bowel movement.    After wiping, spray warm water on the area. Or you can have a sitz bath. This means sitting in a tub with a few inches of water in it. Then pat the area dry or use a hairdryer on a cool setting.    Do not use soap or any solution except water on the area.    You can take a shower unless told not to.    Change sanitary pads at least every 2 to 4 hours.    Place cold or heat packs on the area as directed by your healthcare providers or nurses. Keep a thin towel between the pack and your skin.    Sit on firm seats so the stitches pull less.   follow-up  Schedule a  follow-up exam with your healthcare provider for about 6 weeks after delivery. During this exam, your uterus and vaginal area will be checked. Contact your healthcare provider if you think you or your baby are having any problems.  When to call your healthcare provider  Call your healthcare provider right away if you have:    A fever of 100.4 F (38.0 C) or higher    Bleeding that needs a new sanitary pad after an hour, or large blood clots    Pain in your vagina that gets worse and isn't relieved with medicine    Swelling, discharge, or increased pain from vaginal tear or episiotomy    Burning, pain, red streaks, or lumpy areas in your breasts that may be accompanied by flu-like symptoms    Cracks, blisters, or blood on your nipples    Burning or pain when you urinate    Nausea or vomiting    Dizziness or fainting    Feelings of extreme sadness or anxiety, or a feeling that you don t want to be with your baby    Belly pain that isn t relieved with medicine    Vaginal discharge that has a bad odor    No bowel movement for 5  days    Painful urination, or inability to control urination    Redness, warmth, or pain in the lower leg    Chest pain   Date Last Reviewed: 12/1/2017 2000-2017 [employer]. [address] [place] [address], [place] [address]. All rights reserved. This information is not intended as a substitute for professional medical care. Always follow your healthcare professional's instructions.                After Giving Birth: How to Feel Healthy    Helping yourself feel fit is one of the best things you can do for your baby. A little exercise will tone your muscles. You ll feel stronger and more energized. You ll also feel more awake and aware. Don t worry about your weight right now. Your goal is to feel healthy. Part of feeling good is dressing for comfort. If you dress  smart,  you can be a busy new mom and still look great.  Continue Kegel exercises  You may have been told to do Kegel exercises during pregnancy. These exercises strengthen the muscles that are strained by carrying and delivering the baby. You can return to your Kegels as soon as you feel ready. Why not start today? Squeeze your pelvic floor muscles (the ones that control your urine stream) for at least 5 seconds. Relax, then squeeze again. Work your way up to 50 or 100 Kegels a day.  Exercise often  Exercise helps you get in shape. It also strengthens your muscles, so you are better fit for lifting the baby. As an added benefit, exercise gives you a sense that you re doing something good for yourself. Take your baby for a short walk, or spend 10 minutes stretching. If you were active during pregnancy, you can probably begin light exercise as soon as you feel ready. But be sure to check with your healthcare provider before you begin.  Stay off the scale  For the first month, think about regaining energy and feeling good, not about losing weight. Losing weight too soon can make you feel more tired. Instead, focus on caring for your baby and eating  balanced meals. You may lose some weight without even trying, especially if you re breastfeeding. Once your energy level is back to normal, you can begin to lose weight. A gradual weight loss of 4 or 5 pounds a month is safest.  Pelvic tilt  Lie on your back, with your knees bent and your feet flat on the floor. Now tighten the muscles in your belly and buttocks. As you inhale, press down until your low back flattens against the floor. Hold for a moment, then relax. Repeat 5 times twice a day.  Abdominal curl  Lie on your back with your knees bent and feet flat on the floor. Cross your arms over your chest. Exhale and tighten the muscles in your belly. Gently raise your shoulders off the floor. Hold for 5 counts. Slowly lower your shoulders. Relax, then repeat 3 to 5 times twice a day.  Dress smart  You ll want to be comfortable during the first days after delivery. Wear a robe, pajamas, or sweats -- whatever feels best. Soon you may want to look more like your prepregnant self. Do your hair and wear makeup, if you normally do. A loose-fitting dress may feel good. But do yourself a favor: Don t reach for your jeans. It s likely to be a month or more before you can wear them. If leaking breasts are a problem, put pads inside your bra and dress in layers. If you re breastfeeding, shirts that open in front or pullover tops are good choices. A scarf or shawl can be used as a drape if you breastfeed when others are present.     When to call your healthcare provider  Remember to schedule your postpartum visit. If you delivered by , be seen within 2 weeks. For vaginal delivery, be seen 4 to 6 weeks after the birth. Also, call your healthcare provider if you have heavy bleeding, fever, redness or persistent lump in breasts, unable to void, unable to have a bowel movement after 1 week, severe pain, or worsening depression.   Date Last Reviewed: 2015-2017 [employer]. [address] [facility],  [place] [address]. All rights reserved. This information is not intended as a substitute for professional medical care. Always follow your healthcare professional's instructions.                Breast Care After Birth  A few days after your baby s birth, your breasts will swell with milk. They are likely to feel tender and heavy. This is normal. To help prevent breast soreness and control irritation, follow these tips:     Moist heat, like a shower, helps to promote the release of breastmilk.   Coping with swelling  Here are tips to cope with swelling:    Use cold compresses or an ice pack to help reduce the ache or pain.    Breastfeed often to keep milk from clogging your breast ducts.    If your nipples are flat from breast swelling, hand express some milk. Squeeze out a few drops of milk by massaging and compressing your breasts.    If you have swelling with pain or fever, call your healthcare provider.  Preventing sore nipples  Here are tips to prevent sore nipples:    Make sure baby latches on to your breast correctly. The baby s mouth should be opened very wide and your entire areola should be in the baby's mouth.    You can let milk dry on your nipples. This dried milk can protect the skin on your nipple.    Do not use alcohol, soap, or scented cleansers on your breasts. These can cause the nipples to dry and crack.    Do not wear nursing pads that are lined with plastic. They hold in moisture and can cause chapping.    If you have cracked or bleeding nipples, consult your healthcare provider or a lactation consultant. He or she will make sure that your baby's latch is correct and may suggest topical treatment, like pure lanolin.  Choosing a good bra  Wearing the right-sized bra is especially important now. If a bra is too tight, it may cause a duct in your breast to clog and become irritated. If possible, have a salesperson help fit you for a new bra. Look for one that s 100% cotton and comfortable. Also,  choose a bra with wide straps that won t dig into your back and shoulders. If you re breastfeeding, find a nursing bra that allows you to uncover one breast at a time.  If you are not breastfeeding  Here are tips to avoid discomfort:    Avoid stimulation of nipples    Wear a tight-fitting bra    Apply cold compresses or ice packs for discomfort     Call your healthcare provider   Call your healthcare provider right away if you have any of the following:    A fever or chills    Extreme tiredness and body aches, as if you have the flu    Burning or pain in one or both breasts    Red streaks on a breast    Hard or lumpy spots in one or both breasts    A feeling of warmth or heat in one or both breasts    Breasts so swollen your baby cannot latch on to the nipples    Nipples that are cracked or bleeding    Low milk supply or your milk does not flow freely   Date Last Reviewed: 1/1/2018 2000-2017 [employer]. [address] [place] [address]. All rights reserved. This information is not intended as a substitute for professional medical care. Always follow your healthcare professional's instructions.                After Delivery: When to Call the Healthcare Provider  Health problems sometimes arise with you or your baby following delivery. Call your baby's healthcare provider or your healthcare provider if you see any of the signs below.      Watch your baby for these signs  Call your baby s healthcare provider if your baby:    Has a fever (see Fever and children, below)    Has fewer than 6 wet diapers a day (Hint: Disposable diapers may feel heavy or hard after being soaked.)    Skin or whites of the eyes appear yellow    Cries for a long time, or if it sounds as if the cries are caused by pain    Has diarrhea    Refuses 2 feedings in a row    Is inactive or listless    Is vomiting    Has blood in the stool or vomit    Has a rash    Has ear drainage    Has trouble breathing    Has a  seizure    Will not wake up  Trust your instincts. If you are concerned about your baby, call your baby's healthcare provider.  Fever and children  Always use a digital thermometer to check your child s temperature. Never use a mercury thermometer.  For infants and toddlers, be sure to use a rectal thermometer correctly. A rectal thermometer may accidentally poke a hole in (perforate) the rectum. It may also pass on germs from the stool. Always follow the product maker s directions for proper use. If you don t feel comfortable taking a rectal temperature, use another method. When you talk to your child s healthcare provider, tell him or her which method you used to take your child s temperature.  Here are guidelines for fever temperature. Ear temperatures aren t accurate before 6 months of age. Don t take an oral temperature until your child is at least 4 years old.  Infant under 3 months old:  Ask your child s healthcare provider how you should take the temperature.    Rectal or forehead (temporal artery) temperature of 100.4 F (38 C) or higher, or as directed by the provider   Watch your own health for these signs  Call your own healthcare provider if you have:    Burning or pain in your breasts    Red streaks or hard lumpy areas in your breasts    Problems with breastfeeding    A fever of 100.4 F (38 C) or higher, or as directed by your healthcare provider    Extreme tiredness or body aches, as if you have the flu    Feelings of extreme sadness or anxiety, or a feeling that you don t want to be with your baby    Abdominal pain that isn t relieved with medicine    Vaginal discharge that has a bad odor    Vaginal bleeding that soaks more than one pad per hour    If you had a  section, call for concerns about your incision site such as pain, drainage, or bleeding from your incision  Date Last Reviewed: 2016-2017 [employer]. [address] [place] [address], [place] [address]. All rights  reserved. This information is not intended as a substitute for professional medical care. Always follow your healthcare professional's instructions.

## 2018-07-07 NOTE — PLAN OF CARE
18 1740-  EDC 18- 40+2- Discharged from labor this AM for therapeutic rest - returned to [facility] for increased labor- SVE /-2 posterior- ROM+ sent.  - ROM+ positive -Dr Diane updated with NON to admit to L&D-may have tub/epidural.

## 2018-07-07 NOTE — PLAN OF CARE
7/7/18 0800- Emotional support given to pt regarding prodromal labor and s/sx of preeclampsia- encouraged to make a f/u appt on Monday with Dr Diane for f/u /- call OB if s/sx increase prior to appointment in office -to come to [facility] for assessment- D/C instructions verbally acknowledged with understanding by pt/spouse.

## 2018-07-07 NOTE — PLAN OF CARE
Pt presented to [facility] ambulatory accompanied by spouse.   here for labor evaluation.  Pt states she started maida last night, but that they have become more intense and regular this evening beginning at 1830.  She was seen in the office today and was dilated to 1cm and also had her membranes swept at that time.  Pt denies LOF,  VB, N, V, blurry vision, or RUQ pain.  Pt denies any complications with the pregnancy.    -Monitors applied with pt consent.    -SVE 1+/70/-2, soft, posterior, cx q2-4min, FHT's reactive in the 140's, pt breathing through cx's    -Pt ambulating with spouse    -Monitors applied    - SVE 1/75/-2, cx q2-4, FHT's remain reactive no decels    -Spoke with Dr. Diane, received orders for OBS overnight and therapeutic rest, pt and spouse in agreement with plan    225- Pt transferred to  214 for overnight obs./therapeutic rest

## 2018-07-07 NOTE — PROGRESS NOTES
"2250: This RN assumes care of patient. Room orientation is provided. Linens given to patient and . Plan of care is explained and both pt and  acknowledge understanding. Pt would like to eat a small meal she brought with before medication is given.     2330: This RN to room. Pt ready to have medications for therapeutic rest. This RN explains to patient to call with any concerns, if she had to get up to use the restroom in the event she was dizzy when up, or if her labor pains increased.     0557: Pt placed on monitors for labor assessment and Fetal Wellbeing. Patient states she slept well between \"hard\" contractions.     0645: Dr. Diane calls back from this RN's page. Status update given, but not limited to: SVE, Pt's pain, Vitals, etc. Plan is to DC patient to home and to call clinic line if labor intensifies or if she has any concerns.   "

## 2018-07-07 NOTE — IP AVS SNAPSHOT
[address]    [place] [address]    Phone:  [phone]                                       After Visit Summary   7/7/2018    Stefany Ricardo    MRN: [id number]           After Visit Summary Signature Page     I have received my discharge instructions, and my questions have been answered. I have discussed any challenges I see with this plan with the nurse or doctor.    ..........................................................................................................................................  Patient/Patient Representative Signature      ..........................................................................................................................................  Patient Representative Print Name and Relationship to Patient    ..................................................               ................................................  Date                                            Time    ..........................................................................................................................................  Reviewed by Signature/Title    ...................................................              ..............................................  Date                                                            Time

## 2018-07-08 PROCEDURE — 12000037 ZZH R&B POSTPARTUM INTERMEDIATE

## 2018-07-08 PROCEDURE — 0HQ9XZZ REPAIR PERINEUM SKIN, EXTERNAL APPROACH: ICD-10-PCS | Performed by: SPECIALIST

## 2018-07-08 PROCEDURE — 72200001 ZZH LABOR CARE VAGINAL DELIVERY SINGLE

## 2018-07-08 PROCEDURE — 25000125 ZZHC RX 250: Performed by: SPECIALIST

## 2018-07-08 PROCEDURE — 25000132 ZZH RX MED GY IP 250 OP 250 PS 637: Performed by: SPECIALIST

## 2018-07-08 RX ORDER — OXYTOCIN/0.9 % SODIUM CHLORIDE 30/500 ML
100 PLASTIC BAG, INJECTION (ML) INTRAVENOUS CONTINUOUS
Status: DISCONTINUED | OUTPATIENT
Start: 2018-07-08 | End: 2018-07-10 | Stop reason: HOSPADM

## 2018-07-08 RX ORDER — AMOXICILLIN 250 MG
1 CAPSULE ORAL 2 TIMES DAILY
Status: DISCONTINUED | OUTPATIENT
Start: 2018-07-08 | End: 2018-07-10 | Stop reason: HOSPADM

## 2018-07-08 RX ORDER — OXYTOCIN/0.9 % SODIUM CHLORIDE 30/500 ML
340 PLASTIC BAG, INJECTION (ML) INTRAVENOUS CONTINUOUS PRN
Status: DISCONTINUED | OUTPATIENT
Start: 2018-07-08 | End: 2018-07-10 | Stop reason: HOSPADM

## 2018-07-08 RX ORDER — AMOXICILLIN 250 MG
2 CAPSULE ORAL 2 TIMES DAILY
Status: DISCONTINUED | OUTPATIENT
Start: 2018-07-08 | End: 2018-07-10 | Stop reason: HOSPADM

## 2018-07-08 RX ORDER — BISACODYL 10 MG
10 SUPPOSITORY, RECTAL RECTAL DAILY PRN
Status: DISCONTINUED | OUTPATIENT
Start: 2018-07-10 | End: 2018-07-10 | Stop reason: HOSPADM

## 2018-07-08 RX ORDER — HYDROCORTISONE 2.5 %
CREAM (GRAM) TOPICAL 3 TIMES DAILY PRN
Status: DISCONTINUED | OUTPATIENT
Start: 2018-07-08 | End: 2018-07-10 | Stop reason: HOSPADM

## 2018-07-08 RX ORDER — MISOPROSTOL 200 UG/1
400 TABLET ORAL
Status: DISCONTINUED | OUTPATIENT
Start: 2018-07-08 | End: 2018-07-10 | Stop reason: HOSPADM

## 2018-07-08 RX ORDER — LANOLIN 100 %
OINTMENT (GRAM) TOPICAL
Status: DISCONTINUED | OUTPATIENT
Start: 2018-07-08 | End: 2018-07-10 | Stop reason: HOSPADM

## 2018-07-08 RX ORDER — OXYTOCIN 10 [USP'U]/ML
10 INJECTION, SOLUTION INTRAMUSCULAR; INTRAVENOUS
Status: DISCONTINUED | OUTPATIENT
Start: 2018-07-08 | End: 2018-07-10 | Stop reason: HOSPADM

## 2018-07-08 RX ORDER — BUPROPION HYDROCHLORIDE 150 MG/1
150 TABLET ORAL DAILY
Status: DISCONTINUED | OUTPATIENT
Start: 2018-07-08 | End: 2018-07-10 | Stop reason: HOSPADM

## 2018-07-08 RX ORDER — CALCIUM CARBONATE 500 MG/1
1000 TABLET, CHEWABLE ORAL
Status: DISCONTINUED | OUTPATIENT
Start: 2018-07-08 | End: 2018-07-10 | Stop reason: HOSPADM

## 2018-07-08 RX ORDER — IBUPROFEN 400 MG/1
800 TABLET, FILM COATED ORAL EVERY 6 HOURS PRN
Status: DISCONTINUED | OUTPATIENT
Start: 2018-07-08 | End: 2018-07-10 | Stop reason: HOSPADM

## 2018-07-08 RX ORDER — NALOXONE HYDROCHLORIDE 0.4 MG/ML
.1-.4 INJECTION, SOLUTION INTRAMUSCULAR; INTRAVENOUS; SUBCUTANEOUS
Status: DISCONTINUED | OUTPATIENT
Start: 2018-07-08 | End: 2018-07-10 | Stop reason: HOSPADM

## 2018-07-08 RX ORDER — ACETAMINOPHEN 325 MG/1
650 TABLET ORAL EVERY 4 HOURS PRN
Status: DISCONTINUED | OUTPATIENT
Start: 2018-07-08 | End: 2018-07-10 | Stop reason: HOSPADM

## 2018-07-08 RX ADMIN — ACETAMINOPHEN 650 MG: 325 TABLET, FILM COATED ORAL at 11:47

## 2018-07-08 RX ADMIN — IBUPROFEN 800 MG: 400 TABLET ORAL at 11:48

## 2018-07-08 RX ADMIN — BUPROPION HYDROCHLORIDE 150 MG: 150 TABLET, FILM COATED, EXTENDED RELEASE ORAL at 12:21

## 2018-07-08 RX ADMIN — OXYTOCIN-SODIUM CHLORIDE 0.9% IV SOLN 30 UNIT/500ML 340 ML/HR: 30-0.9/5 SOLUTION at 10:38

## 2018-07-08 RX ADMIN — IBUPROFEN 800 MG: 400 TABLET ORAL at 18:01

## 2018-07-08 RX ADMIN — ACETAMINOPHEN 650 MG: 325 TABLET, FILM COATED ORAL at 18:01

## 2018-07-08 RX ADMIN — SENNOSIDES AND DOCUSATE SODIUM 1 TABLET: 8.6; 5 TABLET ORAL at 20:21

## 2018-07-08 RX ADMIN — CALCIUM CARBONATE (ANTACID) CHEW TAB 500 MG 1000 MG: 500 CHEW TAB at 08:47

## 2018-07-08 NOTE — H&P
OB ADMISSION NOTE    CHIEF COMPLAINT:   Presents to labor and delivery with labor.    HISTORY OF PRESENT ILLNESS:  29 yo  at 40+2 weeks (on day of admission 18) presented in labor.  She had been experiencing prodromal labor symptoms for 3 days prior.  She was admitted for therapeutic rest on the night of  to 18. Came back on the evening of 18 and had changed from 1 to 2 cm and was 85% effaced and ruptured. Prenatal care was uncomplicated.    OBSTETRICAL / DATING HISTORY:  Estimated Date of Delivery: Data Unavailable  Gestational Age:  40w3d    Obstetric History       T1      L0     SAB0   TAB0   Ectopic0   Multiple0   Live Births0       # Outcome Date GA Lbr Moreno/2nd Weight Sex Delivery Anes PTL Lv   1 Term 18 40w3d / 02:24 3.57 kg (7 lb 13.9 oz) M   N       Name: SHANAE RODRIGUEZ           LAB TESTING:  See prenatal records.      PAST MEDICAL HISTORY:  Past Medical History:   Diagnosis Date     Complication of anesthesia     gets very nauseous     Hx of previous reproductive problem     provera, clomid, PCOS     Mental disorder     anxiety, hx of medication (welbutrin)       PAST SURGICAL HISTORY:  Past Surgical History:   Procedure Laterality Date     FOOT SURGERY Right      HC TOOTH EXTRACTION W/FORCEP         ALLERGIES:  amoxicillin    HABITS:  No tobacco/etoh/drugs    HISTORY OF PRESENT ILLNESS:    (Please see scanned  sheets for prenatal history. Examination at the time of admission revealed no interval change in the patient s history or physical exam except as described below.)    See above      REVIEW OF SYSTEMS:  NEUROLOGIC:  Negative  EYES:  Negative  ENT:  Negative  GI:  Negative  BREAST:  Negative  :  Negative  GYN:  Negative  CV:  Negative  PULMONARY:  Negative  MUSCULOSKELETAL:  Negative  PSYCH:  Negative  PHYSICAL EXAM:   /77  Temp 99.2  F (37.3  C) (Temporal)  Resp 18  SpO2 97%  Breastfeeding? Unknown    Gen:  NAD  CV: regular  rate  RESP:  clear  ABDOMEN: gravid, nontender, EFW 7#  Membrane Status:  Ruptured on admit  Fetal Presentation:  vertex  Cervix:  2/85/-2 on admission  GBS:  neg  EFW:  7#    Fetal heart tones: reactive  TOCO: q 3-5    Impression:  IUP at 40w3d with labor  Patient Active Problem List   Diagnosis     Indication for care in labor or delivery      (normal spontaneous vaginal delivery)       Plan:  Admit for labor  Epidural as desired  Anticipate     Geraldine Diane ....................  2018   11:06 AM , pager: [phone]

## 2018-07-08 NOTE — PLAN OF CARE
Data: Stefany Ricardo transferred to 411 via wheelchair at 1300. Baby transferred via parent's arms.  Action: Receiving unit notified of transfer: Yes. Patient and family notified of room change. Report given to Amy MATHEW RN at 1305. Belongings sent to receiving unit. Accompanied by Registered Nurse. Oriented patient to surroundings. Call light within reach. ID bands double-checked with receiving RN.  Response: Patient tolerated transfer and is stable.

## 2018-07-08 NOTE — PLAN OF CARE
Problem: Patient Care Overview  Goal: Plan of Care/Patient Progress Review  Outcome: No Change  VSS P using Ibuprofen and tylenol for pain control. Breastfeeding on demand, baby latching well. Will continue to monitor.

## 2018-07-08 NOTE — PROGRESS NOTES
1915: This RN assumes care of patient from Asha ALICEA RN    1951: Genny placed on patient. Patient would like to walk the unit and take a bath before being rechecked.     2044: What appears to be Decel in FHT from 140bpm down to 85bpm. This RN in room and mom is bending over looking in her bag when this happens. When she stands up the FHT tracing quickly returns to baseline.    2136:  SVE performed. No changes noted. Pt desires labor epidural.    2250: Per MD order. Pitocin started.     [id number]: Indeterminate baseline with possible Prolonged accelerations or possible decelerations. Patient is repositioned several times, with no change in what appears to be early and late decelerations with contractions. Pitocin is shut off for this reason, fluid bolus started.     0100: Report given to Nacho CHUNG RN

## 2018-07-08 NOTE — ANESTHESIA PROCEDURE NOTES
Peripheral nerve/Neuraxial procedure note : epidural catheter  Pre-Procedure  Performed by JEAN VUONG  Location: OB      Pre-Anesthestic Checklist: patient identified, IV checked, risks and benefits discussed, informed consent and monitors and equipment checked    Timeout  Correct Patient: Yes   Correct Procedure: Yes   Correct Site: Yes   Correct Laterality: N/A   Correct Position: Yes   Site Marked: N/A   .   Procedure Documentation    .    Procedure:    Epidural catheter.  Insertion Site:L3-4  (midline approach) Injection technique: LORT saline   Local skin infiltrated with 3 mL of 1% lidocaine.  PATRICK at 5 cm     Patient Prep;povidone-iodine 7.5% surgical scrub.  .  Needle: [employer] needle Needle Gauge: 17.    Needle Length (Inches) 3.5  # of attempts: 1 and # of redirects:  .   . .  Catheter threaded easily  5 cm epidural space.  10 cm at skin.   .    Assessment/Narrative  Paresthesias: No.  .  .  Aspiration negative for heme or CSF  . Test dose of 3 mL lidocaine 1.5% w/ 1:200,000 epinephrine at. Test dose negative for signs of intravascular, subdural or intrathecal injection. Comments:  Labor Epidural  No complications.

## 2018-07-08 NOTE — PLAN OF CARE
Dr. Diane on phone for update. Patient does not report feeling pressure. Plan per provider is start pushing at 0930. Dr. Diane in clinic, page when needed.

## 2018-07-08 NOTE — PROCEDURES
VAGINAL DELIVERY NOTE    DELIVERY TYPE:  vaginal  Intrauterine pregnancy at 40+3 weeks.  Pregnancy complications/risks: none  Labor Type:  spontaneous  Labor Analgesia: epidural  ROM:  spontaneous  Fetal Monitoring:  external  Baby is a male  Apgars were pending  Birth Weight:  7 pounds 14 ounces  Description of Delivery: Patient presented in early labor with SROM after 3 days of prodromal labor.  She received a labor epidural and progressed along a normal labor curve. The fluid was meconium stained.  She became complete and pushed well for just over an hour.  The fetus had a deep variable to the 90's X 1 minute and then 2 minute deceleration to the 60's while , so the patient pushed between contractions to expedite delivery.  The heart tones returned to baseline just before the final push.  A male infant delivered in OA position with meconium present.  The infant was handed of to the [facility] for suctioning.  The infant pinked up, had spontaneous respirations and a normal heartbeat but did not cry for the first couple minutes of life.  The placenta delivered spontaneously and intact.  The patient sustained a first degree labial tear on the left which did not require repair.  She also had a first degree perineal repair which was repaired with 3-0 Vicryl.    Placenta delivered spontaneously, via Schultze mechanism.  Inspection of placenta done, and appeared to be intact.   Complications:  None apparent  Estimated Bloodloss:  203 cc  Sponge and needles counts were done with delivery room nurse, and all were accounted for.   See Labor and Delivery console for information regarding labor length, and times for complete, pushing, and delivery.     Infant transferred to [facility].    Geraldine Diane M.D.

## 2018-07-08 NOTE — PLAN OF CARE
Problem: Labor (Cervical Ripen, Induct, Augment) (Adult,Obstetrics,Pediatric)  Goal: Signs and Symptoms of Listed Potential Problems Will be Absent, Minimized or Managed (Labor)  Signs and symptoms of listed potential problems will be absent, minimized or managed by discharge/transition of care (reference Labor (Cervical Ripen, Induct, Augment) (Adult,Obstetrics,Pediatric) CPG).   Outcome: Completed Date Met: 07/08/18  Patient resting comfortably with infant skin to skin.  and family supportive at bedside. Denies pain. Fundus firm with minimal bleeding. Dr. Diane and Jany MCGEE in attendance for delivery. See delivery summary and flowsheet for details.

## 2018-07-09 LAB — T PALLIDUM AB SER QL: NONREACTIVE

## 2018-07-09 PROCEDURE — 12000037 ZZH R&B POSTPARTUM INTERMEDIATE

## 2018-07-09 PROCEDURE — 25000132 ZZH RX MED GY IP 250 OP 250 PS 637: Performed by: SPECIALIST

## 2018-07-09 RX ADMIN — IBUPROFEN 800 MG: 400 TABLET ORAL at 06:36

## 2018-07-09 RX ADMIN — IBUPROFEN 800 MG: 400 TABLET ORAL at 12:31

## 2018-07-09 RX ADMIN — ACETAMINOPHEN 650 MG: 325 TABLET, FILM COATED ORAL at 12:31

## 2018-07-09 RX ADMIN — Medication 2 SPRAY: at 03:35

## 2018-07-09 RX ADMIN — ACETAMINOPHEN 650 MG: 325 TABLET, FILM COATED ORAL at 06:36

## 2018-07-09 RX ADMIN — IBUPROFEN 800 MG: 400 TABLET ORAL at 18:17

## 2018-07-09 RX ADMIN — ACETAMINOPHEN 650 MG: 325 TABLET, FILM COATED ORAL at 00:31

## 2018-07-09 RX ADMIN — ACETAMINOPHEN 650 MG: 325 TABLET, FILM COATED ORAL at 18:17

## 2018-07-09 RX ADMIN — SENNOSIDES AND DOCUSATE SODIUM 1 TABLET: 8.6; 5 TABLET ORAL at 21:09

## 2018-07-09 RX ADMIN — BUPROPION HYDROCHLORIDE 150 MG: 150 TABLET, FILM COATED, EXTENDED RELEASE ORAL at 07:50

## 2018-07-09 RX ADMIN — SENNOSIDES AND DOCUSATE SODIUM 1 TABLET: 8.6; 5 TABLET ORAL at 07:50

## 2018-07-09 RX ADMIN — IBUPROFEN 800 MG: 400 TABLET ORAL at 00:31

## 2018-07-09 NOTE — ANESTHESIA POSTPROCEDURE EVALUATION
Patient: Stefany Ricardo    * No procedures listed *    Diagnosis:* No pre-op diagnosis entered *  Diagnosis Additional Information: No value filed.    Anesthesia Type:  No value filed.    Note:  Anesthesia Post Evaluation    Patient location during evaluation: Floor  Patient participation: Able to fully participate in evaluation     Anesthetic complications: None    Comments: Pt doing well.  Denies epidural-related complaints.        Last vitals:  Vitals:    07/08/18 1230 07/08/18 1542 07/08/18 2024   BP: 138/82 135/84 126/80   Pulse:  92 70   Resp:  18 16   Temp:  36.7  C (98.1  F) 36.6  C (97.8  F)   SpO2: 96%           Electronically Signed By: MOMO LOMELI MD  July 8, 2018  8:43 PM

## 2018-07-09 NOTE — PLAN OF CARE
Problem: Patient Care Overview  Goal: Plan of Care/Patient Progress Review  Patient meeting expected goals for this shift.  Using ibuprofen, tylenol, ice & tucks for pain/comfort.  Requested  in and out of nursery for rest.  Independent in all self cares.  Working on breastfeeding  and  cares.  Patient's mother is present at bedside and supportive.  Positive bonding behaviors observed.  Continue to monitor and notify MD as needed.

## 2018-07-09 NOTE — LACTATION NOTE
Initial visit with Stefany and .  Baby boy awakened and placed on right breast.  Baby was circumcised and fatigues quickly at breast.     Breastfeeding general information reviewed.   Advised to breastfeed exclusively, on demand, avoid pacifiers, bottles and formula unless medically indicated.  Encouraged rooming in, skin to skin, feeding on demand 8-12x/day or sooner if baby cues.  Explained benefits of holding and skin to skin.  Encouraged lots of skin to skin. Instructed on hand expression.   Continues to nurse well per mom. No further questions at this time.   Will follow as needed.   Billie Brannon BSN, RN, PHN, RNC-MNN, IBCLC

## 2018-07-09 NOTE — PLAN OF CARE
Problem: Patient Care Overview  Goal: Plan of Care/Patient Progress Review  Outcome: Improving  VSS, breastfeeding attempts overnight- skin to skin utilized, pain well controlled with Tylenol and Ibuprofen, up independently with no complaints of dizziness, using nasal sprays for stuffiness, mother at bedside and supportive, interacting and bonding well with infant, encouraged to call with questions or concerns, will continue to monitor.

## 2018-07-09 NOTE — PLAN OF CARE
Problem: Patient Care Overview  Goal: Plan of Care/Patient Progress Review  Outcome: No Change  VSS Pt using Ibuprofen and tylenol for pain control. C/o overall muscle discomfort. Up independently in the room. Breastfeeding baby on demand, baby latching well. Will continue to monitor.

## 2018-07-09 NOTE — PROGRESS NOTES
[facility]   Post-Partum Progress Note          Assessment and Plan:    Assessment:   Post-partum day #1  Normal spontaneous vaginal delivery  L&D complications: * No surgery found *  None      Doing well.  Normal healing wound.  No excessive bleeding  Pain well-controlled.      Plan:   Ambulation encouraged  Discharge tomorrow           Interval History:   Doing well.  Pain is well-controlled.  No fevers.  No history of foul-smelling vaginal discharge.  Good appetite.  Denies chest pain, shortness of breath, nausea or vomiting.  Vaginal bleeding is similar to a heavy menstrual flow.  Ambulatory.            Significant Problems:      Past Medical History:   Diagnosis Date     Complication of anesthesia     gets very nauseous     Hx of previous reproductive problem     provera, clomid, PCOS     Mental disorder     anxiety, hx of medication (welbutrin)             Review of Systems:    The patient denies any chest pain, shortness of breath, excessive pain, fever, chills, purulent drainage from the wound, nausea or vomiting.          Medications:   All medications related to the patient's surgery have been reviewed          Physical Exam:     All vitals stable  Patient Vitals for the past 12 hrs:   BP Temp Temp src Pulse Resp   07/09/18 0115 - - - - 16   07/09/18 0020 113/74 98.2  F (36.8  C) Oral 76 16   07/08/18 2024 126/80 97.8  F (36.6  C) Oral 70 16     Uterine fundus is firm, non-tender and at the level of the umbilicus  Ext NT          Data:     All laboratory data related to this surgery reviewed  Hemoglobin   Date Value Ref Range Status   07/06/2018 13.5 11.7 - 15.7 g/dL Final     No imaging studies have been ordered    Brianne Aguirre MD

## 2018-07-10 VITALS
TEMPERATURE: 98.5 F | HEART RATE: 75 BPM | DIASTOLIC BLOOD PRESSURE: 86 MMHG | RESPIRATION RATE: 16 BRPM | OXYGEN SATURATION: 96 % | SYSTOLIC BLOOD PRESSURE: 123 MMHG

## 2018-07-10 PROCEDURE — 25000132 ZZH RX MED GY IP 250 OP 250 PS 637: Performed by: SPECIALIST

## 2018-07-10 RX ORDER — BUPROPION HYDROCHLORIDE 150 MG/1
150 TABLET ORAL DAILY
Qty: 90 TABLET | Refills: 3 | Status: SHIPPED | OUTPATIENT
Start: 2018-07-10 | End: 2020-05-20

## 2018-07-10 RX ADMIN — IBUPROFEN 800 MG: 400 TABLET ORAL at 00:27

## 2018-07-10 RX ADMIN — ACETAMINOPHEN 650 MG: 325 TABLET, FILM COATED ORAL at 00:27

## 2018-07-10 RX ADMIN — BUPROPION HYDROCHLORIDE 150 MG: 150 TABLET, FILM COATED, EXTENDED RELEASE ORAL at 07:52

## 2018-07-10 RX ADMIN — ACETAMINOPHEN 650 MG: 325 TABLET, FILM COATED ORAL at 07:52

## 2018-07-10 RX ADMIN — IBUPROFEN 800 MG: 400 TABLET ORAL at 07:52

## 2018-07-10 RX ADMIN — SENNOSIDES AND DOCUSATE SODIUM 1 TABLET: 8.6; 5 TABLET ORAL at 07:52

## 2018-07-10 NOTE — PROGRESS NOTES
Postpartum Day 3: Vaginal delivery    Active Problems:    Indication for care in labor or delivery     (normal spontaneous vaginal delivery)      Subjective:  Patient reports doing well this morning.  She reports her anxiety has been well controlled.  Pain controlled.  Ambulating and voiding without issue.  Tolerating regular diet without N/V.  No fever, chills, chest pain, shortness of breath.  Breast feeding going well. Decreasing lochia.    Objective:  Patient Vitals for the past 12 hrs:   BP Temp Temp src Pulse Resp   18 2320 127/90 97.9  F (36.6  C) Oral 75 16   18 2111 130/87 - - 70 -       Recent Labs   Lab Test  18   2310   HGB  13.5        Gen: NAD, lying in bed  Abd: soft, minimally tender, firm fundus at umbilicus  Extrem: NACHO bilat, non-tender, 1+ pitting edema of feet    Assessment/Plan: 28 year old  postpartum day #3, recovering well  -routine postpartum care    -patient with minimally elevated BP (120-130's/80-90's).  Patient has BP cuff at home, will check blood pressure and call clinic with results on Friday. Reviewed calling if vision changes, headache, RUQ pain or increase in swelling.     -Anxiety controlled with current medication, will send patient with medication.     -Discharge home toPARVEZ Hall CNP on 7/10/2018 at 6:59 AM

## 2018-07-10 NOTE — PLAN OF CARE
Problem: Patient Care Overview  Goal: Plan of Care/Patient Progress Review  Patient meeting expected goals for this shift.  Using ibuprofen & tylenol for pain/comfort.  Blood pressures elevated this shift - see flowsheet.  Independent in all self and  cares.  Working on breastfeeding .   present at bedside and supportive.  Positive bonding behaviors observed.  Continue to monitor and notify MD as needed.

## 2018-07-10 NOTE — PLAN OF CARE
Problem: Patient Care Overview  Goal: Plan of Care/Patient Progress Review  Outcome: Adequate for Discharge Date Met: 07/10/18  VSS Pt using Ibuprofen and tylenol for pain control. Up independently in the room. Breastfeeding baby on demand, baby latching well. Discharging to home with  and baby later this afternoon.

## 2018-07-10 NOTE — DISCHARGE SUMMARY
OB VAGINAL BIRTH DISCHARGE SUMMARY    ADMISSION DATE:  2018  DISCHARGE DATE:  7/10/2018    HOSPITAL COURSE / PROCEDURE INFORMATION:  Stefany Ricardo is a 28 year old  who presented for labor    She had a  vaginal delivery of a infant weighing 3.57 kg (7lbs 13oz).  Apgars were 5 and 8 and 9 at 1 and 5  And 10 minute respectively.  Postpartum course was uncomplicated.    PRINCIPAL DIAGNOSIS:  Patient Active Problem List   Diagnosis     Indication for care in labor or delivery      (normal spontaneous vaginal delivery)        POST-PARTUM COMPLICATIONS:  None    CONTRACEPTION:  To be discussed at Postpartum Visit    Important pending test results:  None    DISCHARGE PLAN:  Stefany Ricardo is a 28 year old  female who will be discharged home in good condition.          Review of your medicines      START taking       Dose / Directions    buPROPion 150 MG 24 hr tablet   Commonly known as:  WELLBUTRIN XL   Used for:  Anxiety        Dose:  150 mg   Take 1 tablet (150 mg) by mouth daily   Quantity:  90 tablet   Refills:  3         CONTINUE these medicines which have NOT CHANGED       Dose / Directions    calcium polycarbophil 625 MG tablet   Commonly known as:  FIBERCON        Dose:  2 tablet   Take 2 tablets by mouth daily   Refills:  0       prenatal multivitamin plus iron 27-0.8 MG Tabs per tablet        Dose:  1 tablet   Take 1 tablet by mouth daily   Refills:  0       VITAMIN D (CHOLECALCIFEROL) PO        Dose:  5000 Units   Take 5,000 Units by mouth daily   Refills:  0            Where to get your medicines      These medications were sent to [facility] - [address]  [address] [facility] [address], [place] [address]     Phone:  [phone]      buPROPion 150 MG 24 hr tablet             Discharge Procedure Orders  Activity   Order Comments: Review discharge instructions   Order Specific Question Answer Comments   Is discharge order? Yes      Reason for your hospital stay    Order Comments: Maternity care     Follow Up and recommended labs and tests   Order Comments: Call [facility] on Friday 7/13 with blood pressure.  Follow-up in clinic in 6 weeks.     Discharge Instructions - Postpartum visit   Order Comments: Schedule postpartum visit with your provider and return to clinic in 6 weeks.  Please call clinic on Friday 7/13 with at home blood pressure reading.     Diet   Order Comments: Resume previous diet   Order Specific Question Answer Comments   Is discharge order? Yes         PARVEZ Dotson CNP on 7/10/2018 at 7:33 AM

## 2018-07-10 NOTE — LACTATION NOTE
Routine visit. Asked to see  Stefany regarding questions about concerns over milk supply and latching baby.  He latched and suckled well soon after birth, but has been sleepy most of the time since. Cluster fed last night.   Currently he is latched and suckling vigorously with audible swallowing noted.  We reviewed general breastfeeding information.  Burping positions reviewed.  Explained how milk supply is established and maintained.  Showed how to position baby so that he is able to latch deeply.   Showed parents how to identify and correct a poor latch.     Getting ready for discharge.  Plan: Watch for feeding cues and feed every 2-3 hours and/or on demand. Continue to use feeding log to track intake and appropriate voids and stools. Take feeding log to first follow up appointment or weight check. Encourage skin to skin to promote frequent feedings, thermoregulation and bonding. Follow-up with healthcare provider or lactation consultant for questions or concerns. No further questions at this time. Billie Brannon BSN, RN, PHN, RNC-MNN, IBCLC

## 2018-07-12 ENCOUNTER — HOSPITAL ENCOUNTER (OUTPATIENT)
Facility: CLINIC | Age: 28
End: 2018-07-12
Attending: OBSTETRICS & GYNECOLOGY | Admitting: OBSTETRICS & GYNECOLOGY
Payer: COMMERCIAL

## 2018-07-12 ENCOUNTER — HOSPITAL ENCOUNTER (OUTPATIENT)
Facility: CLINIC | Age: 28
Setting detail: OBSERVATION
Discharge: HOME | End: 2018-07-14
Attending: OBSTETRICS & GYNECOLOGY | Admitting: OBSTETRICS & GYNECOLOGY
Payer: COMMERCIAL

## 2018-07-12 DIAGNOSIS — I10 HYPERTENSION, UNSPECIFIED TYPE: Primary | ICD-10-CM

## 2018-07-12 LAB
ALT SERPL W P-5'-P-CCNC: 56 U/L (ref 0–50)
AST SERPL W P-5'-P-CCNC: 36 U/L (ref 0–45)
CREAT SERPL-MCNC: 0.86 MG/DL (ref 0.52–1.04)
GFR SERPL CREATININE-BSD FRML MDRD: 79 ML/MIN/1.7M2
PLATELET # BLD AUTO: 212 10E9/L (ref 150–450)

## 2018-07-12 PROCEDURE — 82565 ASSAY OF CREATININE: CPT | Performed by: OBSTETRICS & GYNECOLOGY

## 2018-07-12 PROCEDURE — 85049 AUTOMATED PLATELET COUNT: CPT | Performed by: OBSTETRICS & GYNECOLOGY

## 2018-07-12 PROCEDURE — 84460 ALANINE AMINO (ALT) (SGPT): CPT | Performed by: OBSTETRICS & GYNECOLOGY

## 2018-07-12 PROCEDURE — 84450 TRANSFERASE (AST) (SGOT): CPT | Performed by: OBSTETRICS & GYNECOLOGY

## 2018-07-12 PROCEDURE — 36415 COLL VENOUS BLD VENIPUNCTURE: CPT | Performed by: OBSTETRICS & GYNECOLOGY

## 2018-07-12 PROCEDURE — G0378 HOSPITAL OBSERVATION PER HR: HCPCS

## 2018-07-12 RX ORDER — MAGNESIUM SULFATE HEPTAHYDRATE 500 MG/ML
4 INJECTION, SOLUTION INTRAMUSCULAR; INTRAVENOUS
Status: DISCONTINUED | OUTPATIENT
Start: 2018-07-12 | End: 2018-07-14 | Stop reason: HOSPADM

## 2018-07-12 RX ORDER — MAGNESIUM SULFATE HEPTAHYDRATE 40 MG/ML
2 INJECTION, SOLUTION INTRAVENOUS
Status: DISCONTINUED | OUTPATIENT
Start: 2018-07-12 | End: 2018-07-14 | Stop reason: HOSPADM

## 2018-07-12 RX ORDER — MAGNESIUM SULFATE IN WATER 40 MG/ML
2 INJECTION, SOLUTION INTRAVENOUS CONTINUOUS
Status: DISCONTINUED | OUTPATIENT
Start: 2018-07-12 | End: 2018-07-13

## 2018-07-12 RX ORDER — LABETALOL 200 MG/1
200 TABLET, FILM COATED ORAL EVERY 8 HOURS SCHEDULED
Status: DISCONTINUED | OUTPATIENT
Start: 2018-07-12 | End: 2018-07-14 | Stop reason: HOSPADM

## 2018-07-12 RX ORDER — LORAZEPAM 2 MG/ML
2 INJECTION INTRAMUSCULAR
Status: DISCONTINUED | OUTPATIENT
Start: 2018-07-12 | End: 2018-07-14 | Stop reason: HOSPADM

## 2018-07-12 RX ORDER — CALCIUM GLUCONATE 94 MG/ML
1 INJECTION, SOLUTION INTRAVENOUS
Status: DISCONTINUED | OUTPATIENT
Start: 2018-07-12 | End: 2018-07-14 | Stop reason: HOSPADM

## 2018-07-12 RX ORDER — SODIUM CHLORIDE, SODIUM LACTATE, POTASSIUM CHLORIDE, CALCIUM CHLORIDE 600; 310; 30; 20 MG/100ML; MG/100ML; MG/100ML; MG/100ML
10-125 INJECTION, SOLUTION INTRAVENOUS CONTINUOUS
Status: DISCONTINUED | OUTPATIENT
Start: 2018-07-12 | End: 2018-07-14 | Stop reason: HOSPADM

## 2018-07-12 RX ORDER — MAGNESIUM SULFATE HEPTAHYDRATE 40 MG/ML
4 INJECTION, SOLUTION INTRAVENOUS
Status: DISCONTINUED | OUTPATIENT
Start: 2018-07-12 | End: 2018-07-14 | Stop reason: HOSPADM

## 2018-07-12 RX ORDER — LIDOCAINE 40 MG/G
CREAM TOPICAL
Status: DISCONTINUED | OUTPATIENT
Start: 2018-07-12 | End: 2018-07-14 | Stop reason: HOSPADM

## 2018-07-12 NOTE — IP AVS SNAPSHOT
[address]    [place] [address]    Phone:  [phone]                                       After Visit Summary   7/12/2018    Stefany Ricardo    MRN: [id number]           After Visit Summary Signature Page     I have received my discharge instructions, and my questions have been answered. I have discussed any challenges I see with this plan with the nurse or doctor.    ..........................................................................................................................................  Patient/Patient Representative Signature      ..........................................................................................................................................  Patient Representative Print Name and Relationship to Patient    ..................................................               ................................................  Date                                            Time    ..........................................................................................................................................  Reviewed by Signature/Title    ...................................................              ..............................................  Date                                                            Time

## 2018-07-12 NOTE — IP AVS SNAPSHOT
MRN:[id number]                      After Visit Summary   7/12/2018    Stefany Ricardo    MRN: [id number]           Thank you!     Thank you for choosing [facility] for your care. Our goal is always to provide you with excellent care. Hearing back from our patients is one way we can continue to improve our services. Please take a few minutes to complete the written survey that you may receive in the mail after you visit with us. Thank you!        Patient Information     Date Of Birth          1990        About your hospital stay     You were admitted on:  July 12, 2018 You last received care in the:  [address]    You were discharged on:  July 14, 2018        Reason for your hospital stay       Maternity care                  Who to Call     For medical emergencies, please call 911.  For non-urgent questions about your medical care, please call your primary care provider or clinic, [phone]          Attending Provider     Provider Heather Moreno MD OB/Gyn       Primary Care Provider Office Phone # Fax #    Yanet Can [phone]      After Care Instructions     Activity       Review discharge instructions            Diet       Resume previous diet                  Follow-up Appointments     Follow Up and recommended labs and tests       Follow up with Dr. Can Monday or Tues for a BP check                  Further instructions from your care team       Preeclampsia   Call your doctor right away if you have any of the following:  - Edema (swelling) in your face or hands  - Rapid weight gain-about 1 pound or more in a day  - Headache  - Abdominal pain on your right side  - Vision problems (flashes or spots)  - You have questions or concerns once you return home.    Pending Results     Date and Time Order Name Status Description    7/14/2018 1011 AST In process     7/14/2018 1011 ALT In process             Statement  "of Approval     Ordered          18 1142  I have reviewed and agree with all the recommendations and orders detailed in this document.  EFFECTIVE NOW     Approved and electronically signed by:  Brianne gAuirre MD             Admission Information     Date & Time Provider Department Dept. Phone    2018 Heather Tompkins MD [address] [phone]      Your Vitals Were     Blood Pressure Pulse Temperature Respirations Weight BMI (Body Mass Index)    110/70 76 97.5  F (36.4  C) (Oral) 16 75.4 kg (166 lb 3.6 oz) 28.53 kg/m2      MyChart Information     [employer] lets you send messages to your doctor, view your test results, renew your prescriptions, schedule appointments and more. To sign up, go to [email or web address] . Click on \"Log in\" on the left side of the screen, which will take you to the Welcome page. Then click on \"Sign up Now\" on the right side of the page.     You will be asked to enter the access code listed below, as well as some personal information. Please follow the directions to create your username and password.     Your access code is: [email or web address]  Expires: 2018  5:41 PM     Your access code will  in 90 days. If you need help or a new code, please call your [facility] clinic or [phone].        Care EveryWhere ID     This is your Care EveryWhere ID. This could be used by other organizations to access your [facility] medical records  [id number]        Equal Access to Services     [facility] YUSUF : Hadii kingsley rodo Solesaali, waaxda luqadaha, qaybta kaalmada andrew, bj gonzales. So [facility] [place] [phone].    ATENCIÓN: Si habla español, tiene a corrales disposición servicios gratuitos de asistencia lingüística. Llame al [phone].    We comply with applicable federal civil rights laws and [place] laws. We do not discriminate on the basis of race, color, national origin, age, disability, sex, sexual orientation, " or gender identity.               Review of your medicines      START taking        Dose / Directions    labetalol 200 MG tablet   Commonly known as:  NORMODYNE        Dose:  200 mg   Take 1 tablet (200 mg) by mouth every 8 hours   Quantity:  60 tablet   Refills:  1       NIFEdipine ER 30 MG Tb24   Commonly known as:  ADALAT CC        Dose:  30 mg   Start taking on:  7/15/2018   Take 1 tablet (30 mg) by mouth daily   Quantity:  30 tablet   Refills:  0         CONTINUE these medicines which have NOT CHANGED        Dose / Directions    buPROPion 150 MG 24 hr tablet   Commonly known as:  WELLBUTRIN XL   Used for:  Anxiety        Dose:  150 mg   Take 1 tablet (150 mg) by mouth daily   Quantity:  90 tablet   Refills:  3       calcium polycarbophil 625 MG tablet   Commonly known as:  FIBERCON        Dose:  2 tablet   Take 2 tablets by mouth daily   Refills:  0       prenatal multivitamin plus iron 27-0.8 MG Tabs per tablet        Dose:  1 tablet   Take 1 tablet by mouth daily   Refills:  0       VITAMIN D (CHOLECALCIFEROL) PO        Dose:  5000 Units   Take 5,000 Units by mouth daily   Refills:  0            Where to get your medicines      Some of these will need a paper prescription and others can be bought over the counter. Ask your nurse if you have questions.     Bring a paper prescription for each of these medications     labetalol 200 MG tablet    NIFEdipine ER 30 MG Tb24                Protect others around you: Learn how to safely use, store and throw away your medicines at www.disposemymeds.org.             Medication List: This is a list of all your medications and when to take them. Check marks below indicate your daily home schedule. Keep this list as a reference.      Medications           Morning Afternoon Evening Bedtime As Needed    buPROPion 150 MG 24 hr tablet   Commonly known as:  WELLBUTRIN XL   Take 1 tablet (150 mg) by mouth daily                                calcium polycarbophil 625 MG tablet    Commonly known as:  FIBERCON   Take 2 tablets by mouth daily                                labetalol 200 MG tablet   Commonly known as:  NORMODYNE   Take 1 tablet (200 mg) by mouth every 8 hours   Last time this was given:  200 mg on 7/14/2018  8:22 AM                                NIFEdipine ER 30 MG Tb24   Commonly known as:  ADALAT CC   Take 1 tablet (30 mg) by mouth daily   Start taking on:  7/15/2018   Last time this was given:  30 mg on 7/14/2018  8:22 AM                                prenatal multivitamin plus iron 27-0.8 MG Tabs per tablet   Take 1 tablet by mouth daily                                VITAMIN D (CHOLECALCIFEROL) PO   Take 5,000 Units by mouth daily

## 2018-07-13 LAB
ALT SERPL W P-5'-P-CCNC: 55 U/L (ref 0–50)
AST SERPL W P-5'-P-CCNC: 30 U/L (ref 0–45)
CREAT SERPL-MCNC: 0.76 MG/DL (ref 0.52–1.04)
GFR SERPL CREATININE-BSD FRML MDRD: >90 ML/MIN/1.7M2
PLATELET # BLD AUTO: 202 10E9/L (ref 150–450)

## 2018-07-13 PROCEDURE — 96374 THER/PROPH/DIAG INJ IV PUSH: CPT

## 2018-07-13 PROCEDURE — 82565 ASSAY OF CREATININE: CPT | Performed by: OBSTETRICS & GYNECOLOGY

## 2018-07-13 PROCEDURE — 96376 TX/PRO/DX INJ SAME DRUG ADON: CPT

## 2018-07-13 PROCEDURE — 25000132 ZZH RX MED GY IP 250 OP 250 PS 637: Performed by: SPECIALIST

## 2018-07-13 PROCEDURE — 25000132 ZZH RX MED GY IP 250 OP 250 PS 637: Performed by: OBSTETRICS & GYNECOLOGY

## 2018-07-13 PROCEDURE — 85049 AUTOMATED PLATELET COUNT: CPT | Performed by: OBSTETRICS & GYNECOLOGY

## 2018-07-13 PROCEDURE — G0378 HOSPITAL OBSERVATION PER HR: HCPCS

## 2018-07-13 PROCEDURE — 36415 COLL VENOUS BLD VENIPUNCTURE: CPT | Performed by: OBSTETRICS & GYNECOLOGY

## 2018-07-13 PROCEDURE — 84460 ALANINE AMINO (ALT) (SGPT): CPT | Performed by: OBSTETRICS & GYNECOLOGY

## 2018-07-13 PROCEDURE — 84450 TRANSFERASE (AST) (SGOT): CPT | Performed by: OBSTETRICS & GYNECOLOGY

## 2018-07-13 PROCEDURE — 25000128 H RX IP 250 OP 636: Performed by: OBSTETRICS & GYNECOLOGY

## 2018-07-13 RX ORDER — NIFEDIPINE 30 MG/1
30 TABLET, EXTENDED RELEASE ORAL DAILY
Status: DISCONTINUED | OUTPATIENT
Start: 2018-07-13 | End: 2018-07-14 | Stop reason: HOSPADM

## 2018-07-13 RX ORDER — ACETAMINOPHEN 325 MG/1
325-650 TABLET ORAL EVERY 4 HOURS PRN
Status: DISCONTINUED | OUTPATIENT
Start: 2018-07-13 | End: 2018-07-14 | Stop reason: HOSPADM

## 2018-07-13 RX ADMIN — ACETAMINOPHEN 650 MG: 325 TABLET, FILM COATED ORAL at 15:22

## 2018-07-13 RX ADMIN — ACETAMINOPHEN 650 MG: 325 TABLET, FILM COATED ORAL at 09:16

## 2018-07-13 RX ADMIN — LABETALOL HCL 200 MG: 200 TABLET, FILM COATED ORAL at 16:20

## 2018-07-13 RX ADMIN — LABETALOL HCL 200 MG: 200 TABLET, FILM COATED ORAL at 09:16

## 2018-07-13 RX ADMIN — SODIUM CHLORIDE, POTASSIUM CHLORIDE, SODIUM LACTATE AND CALCIUM CHLORIDE 75 ML/HR: 600; 310; 30; 20 INJECTION, SOLUTION INTRAVENOUS at 00:27

## 2018-07-13 RX ADMIN — NIFEDIPINE 30 MG: 30 TABLET, FILM COATED, EXTENDED RELEASE ORAL at 13:40

## 2018-07-13 RX ADMIN — LABETALOL HCL 200 MG: 200 TABLET, FILM COATED ORAL at 01:01

## 2018-07-13 RX ADMIN — ACETAMINOPHEN 650 MG: 325 TABLET, FILM COATED ORAL at 20:50

## 2018-07-13 RX ADMIN — MAGNESIUM SULFATE IN WATER 2 G/HR: 40 INJECTION, SOLUTION INTRAVENOUS at 00:28

## 2018-07-13 RX ADMIN — MAGNESIUM SULFATE IN WATER 2 G/HR: 40 INJECTION, SOLUTION INTRAVENOUS at 09:39

## 2018-07-13 NOTE — H&P
2018    Stefany Ricardo  [id number]            OB postpartum readmit History & Physical           HPI:   29 yo  postpartum day #4 vaginal delivery, hx gestational HTN, presents for hospital observation due to elevated BP at home, mild LFT elevation (drawn at clinic today), headache.    Evaluated in clinic earlier today, reported intermittent BP elevation in mild range at home, while BP in 130's/80's range in clinic.    Called this evening, anxious, wanted to learn results of lab tests, reported dull mild headache.   Outpatient labs today: AST 42 (10-40), ALT 51 (10-50), platelets 233, creatinine 0.8.    Reviewed options including observation in hospital with serial BP and lab monitoring and patient desires this.   Reports ankle swelling which is  worse than when in labor, but somewhat improved from after delivery.   Denies upper abdominal pain.  Denies seeing spots, sometimes sees sparkles.   Reports prior hx anxiety which often manifests as chest tightness, and had some of this today which she attributes to anxiety about BP (feels better that in hospital now).   Infant &  present.  Pumping.  Also has lactation concerns re: catal[place], was planning outpatient consult tomorrow.      Her OB history:   Obstetric History       T1      L1     SAB0   TAB0   Ectopic0   Multiple0   Live Births1       # Outcome Date GA Lbr Moreno/2nd Weight Sex Delivery Anes PTL Lv   1 Term 18 40w3d / 02:24 3.57 kg (7 lb 13.9 oz) M Vag-Spont  N HAILEE      Name: SHANAE RICARDO      Apgar1:  5                Apgar5: 8               Past Medical History:   Diagnosis Date     Complication of anesthesia     gets very nauseous     Hx of previous reproductive problem     provera, clomid, PCOS     Mental disorder     anxiety, hx of medication (welbutrin)          Past Surgical History:   Procedure Laterality Date     FOOT SURGERY Right      HC TOOTH EXTRACTION W/FORCEP           No current outpatient  prescriptions on file.       Allergies: Amoxicillin      REVIEW OF SYSTEMS:  NEUROLOGIC:  Dull headache  EYES:  Occasionally sees sparkles  ENT:  Negative  GI:  Negative  BREAST:  Negative  :  Negative  GYN:  Negative  CV:  Elevated BP  PULMONARY:  Negative  MUSCULOSKELETAL:  Negative  PSYCH:  Negative        PE  BP (!) 138/98  Temp 98  F (36.7  C) (Oral)  Resp 16  Repeat BP  148/99    Alert Awake in NAD  HEENT grossly normal  Neck: no lymphadenopathy or thryoidomegaly  Lungs - CTA bilaterally no crackles  Heart - RRR  ABD - no RUQ tenderness  Extrem - 1+ bilat LE edema extending just past ankles        Assessment:    27 yo   PPD #4 with elevated BP (mild range), mild LFT elevation, dull headache.  Hx anxiety.    Plan:    Hospital observation, serial BP's, serial labs.  Stop ibuprofen/tylenol.  If persistently elevated BP would start oral antihypertensive.  Strict I/O.  Discussed possible need for magnesium sulfate if develops severely elevated BP, or worrisome lab findings, or worsening symptoms.  Lactation consult in am.      Heather Tompkins MD  Dept of OB/GYN  2018

## 2018-07-13 NOTE — PROGRESS NOTES
OB service    Several DBP noted in  range, within setting of headache.    Labs:  Creatinine 0.86  Platelets 212  AST 36 (nml)  ALT 56  (0-50)  PCR pending      Plan:  Start labetalol 200 mg po every 8 hrs.  Start magnesium at 2 g/hour, reassess in 12-24 hours re continuation.  Recheck labs early am.    Heather Tompkins  7/12/18

## 2018-07-13 NOTE — PLAN OF CARE
Problem: Patient Care Overview  Goal: Plan of Care/Patient Progress Review  Outcome: Improving  Patient stable. BPs improving- see flow sheets. Mag & LR running at 125 mL/hr- see MAR. Declines blurred vision and epigastric pain.  Mild dependent edema noted.  Patient continues to complain of a mild headache since admission. Reflexes 2+ and no clonus present. I&Os WDL. Re assessed patients right sided weakness and patient states the weakness is completely gone. Encouraged patient to call if new symptoms occur. Will continue to monitor.

## 2018-07-13 NOTE — PLAN OF CARE
Problem: Patient Care Overview  Goal: Plan of Care/Patient Progress Review  Outcome: No Change  Patient has complained of a mild headache throughout the day today. She had some improvement with headache this morning after dose of tylenol, but this afternoon stated tylenol has not helped.  Patient declined need for further pain medication.  Patient trying to drink caffeine to see if that helps as well.  Denies vision changes and epigastric pain.  BP's 120's-130's/80's-90's. Magnesium discontinued at 1300.  Administered procardia PO after magnesium turned off per MD orders.  Taking labetalol PO as ordered.  Patient complained of discomfort with IV and wanted out.  Discontinued IV and RN attempted to restart an IV for saline lock but unable.  Notified Dr. Can and ok for patient to remain without IV access.  Family present and supportive.  Lactation at bedside this afternoon. Patient using breast pump as needed.  Will continue to monitor.

## 2018-07-13 NOTE — PLAN OF CARE
2030:   Pt is a direct admit from clinic visit earlier today. Pt being assessed for pre-eclampsia. 5 days PP with elevated pressures,swelling, dizziness and dull headache per pt report.    Dr Tompkins at bedside to assess and direct plan of care. Plan to watch BP's q 30 min x 4; then BP q 4. Labs ordered. Dr Tompkins is in house and will watch serial BP's and lab results and then change plan of care as needed.     Pt agrees to plan of care. Pt accompanied by  and infant.     Rooming- in agreement signed.

## 2018-07-13 NOTE — PLAN OF CARE
2250: Dr Tompkins called after watching pt's BP's and placed orders to begin labetalol and asked RN to enter orders to begin Magnesium: no loading dose, run @ 2 g/hr.  Rounding MD will reassess in am as to whether to continue mag for 24 hours.     RN placed magnesium orders and will discuss plan of care change with pt and spouse.

## 2018-07-13 NOTE — PROVIDER NOTIFICATION
07/13/18 0050   Provider Notification   Provider Name/Title Dr. Tompkins   Method of Notification Phone   Request Evaluate-Remote   Notification Reason Other  (Right sided weakness)     Patient complaining of weakness in her right arm extending into her neck that has worsen since 2200. Pt states she can barely move her arm because of weakness.  Neuro check WDL.  Patient also complaining of a dull headache that has been consistent since admission.  Paged Dr. Tompkins about change in patient status.  Dr. Tompkins would like to continue to monitor patient overnight and consult hospitalist if symptoms worsen. Patients HR has also been below 60, Labetalol held per parameters.  Dr. Tompkins would like medication to be given with HR below 60.  Updated patient about plan of care and told to call if symptoms worsen.

## 2018-07-13 NOTE — PROGRESS NOTES
Has more of a headache today than yesterday.  Got some sleep overnight (more than in a long time).  No spots in vision.    BP (!) 131/91  Temp 97.9  F (36.6  C) (Oral)  Resp 16  Wt 76.7 kg (169 lb)  BMI 29.01 kg/m2   Gen:  Alert and oriented.  Ambulating without difficulty  Abd;  nontender    AST and ALT slightly improved from yesterday.    Assessment:  Post partum day 5.  Post partum preeclampsia.    Plan:  Check BP's this am.  If improved then discontinue magnesium sulfate and observe 6-8 hours.      Geraldine Diane ....................  7/13/2018   7:48 AM , pager: [phone]

## 2018-07-14 VITALS
SYSTOLIC BLOOD PRESSURE: 110 MMHG | BODY MASS INDEX: 28.53 KG/M2 | RESPIRATION RATE: 16 BRPM | DIASTOLIC BLOOD PRESSURE: 70 MMHG | WEIGHT: 166.23 LBS | TEMPERATURE: 97.5 F | HEART RATE: 76 BPM

## 2018-07-14 LAB
ALT SERPL W P-5'-P-CCNC: 43 U/L (ref 0–50)
AST SERPL W P-5'-P-CCNC: 27 U/L (ref 0–45)

## 2018-07-14 PROCEDURE — 25000132 ZZH RX MED GY IP 250 OP 250 PS 637: Performed by: SPECIALIST

## 2018-07-14 PROCEDURE — 25000132 ZZH RX MED GY IP 250 OP 250 PS 637: Performed by: OBSTETRICS & GYNECOLOGY

## 2018-07-14 PROCEDURE — 84450 TRANSFERASE (AST) (SGOT): CPT | Performed by: OBSTETRICS & GYNECOLOGY

## 2018-07-14 PROCEDURE — G0378 HOSPITAL OBSERVATION PER HR: HCPCS

## 2018-07-14 PROCEDURE — 84460 ALANINE AMINO (ALT) (SGPT): CPT | Performed by: OBSTETRICS & GYNECOLOGY

## 2018-07-14 PROCEDURE — 36415 COLL VENOUS BLD VENIPUNCTURE: CPT | Performed by: OBSTETRICS & GYNECOLOGY

## 2018-07-14 RX ORDER — NIFEDIPINE 30 MG
30 TABLET, EXTENDED RELEASE ORAL DAILY
Qty: 30 TABLET | Refills: 0 | Status: SHIPPED | OUTPATIENT
Start: 2018-07-15 | End: 2020-05-20

## 2018-07-14 RX ORDER — LABETALOL 200 MG/1
200 TABLET, FILM COATED ORAL EVERY 8 HOURS
Qty: 60 TABLET | Refills: 1 | Status: SHIPPED | OUTPATIENT
Start: 2018-07-14 | End: 2020-05-20

## 2018-07-14 RX ADMIN — NIFEDIPINE 30 MG: 30 TABLET, FILM COATED, EXTENDED RELEASE ORAL at 08:22

## 2018-07-14 RX ADMIN — ACETAMINOPHEN 650 MG: 325 TABLET, FILM COATED ORAL at 05:04

## 2018-07-14 RX ADMIN — ACETAMINOPHEN 650 MG: 325 TABLET, FILM COATED ORAL at 08:22

## 2018-07-14 RX ADMIN — LABETALOL HCL 200 MG: 200 TABLET, FILM COATED ORAL at 01:02

## 2018-07-14 RX ADMIN — LABETALOL HCL 200 MG: 200 TABLET, FILM COATED ORAL at 08:22

## 2018-07-14 RX ADMIN — ACETAMINOPHEN 650 MG: 325 TABLET, FILM COATED ORAL at 01:04

## 2018-07-14 NOTE — PLAN OF CARE
Problem: Patient Care Overview  Goal: Plan of Care/Patient Progress Review  Outcome: Improving  VSS. Patient c/o of headache which is somewhat improved with tylenol. Slight edema noted in lower extremities. No clonus present. Reflexes +2, normal. Patient eating and drinking adequate, output good. Up independent. AM labs. Will continue to monitor.

## 2018-07-14 NOTE — PROGRESS NOTES
[facility] Obstetrics Post-Partum Progress Note          Assessment and Plan:    Assessment:   Post-partum day #6  Normal spontaneous vaginal delivery  Readmitted 7/12/18 with preeclampsia with severe features (headache)  L&D complications: None          Plan:   Discharge later today           Interval History:   Doing well.  Pain is well-controlled.  No fevers.  No history of foul-smelling vaginal discharge.  Good appetite.  Denies chest pain, shortness of breath, nausea or vomiting.  Vaginal bleeding is similar to a heavy menstrual flow.  Ambulatory.  Breastfeeding well. Minimal headache today.           Significant Problems:      Past Medical History:   Diagnosis Date     Complication of anesthesia     gets very nauseous     Hx of previous reproductive problem     provera, clomid, PCOS     Mental disorder     anxiety, hx of medication (welbutrin)             Review of Systems:    The patient denies any chest pain, shortness of breath, excessive pain, fever, chills, purulent drainage from the wound, nausea or vomiting.          Medications:   All medications related to the patient's surgery have been reviewed          Physical Exam:     All vitals stable  Patient Vitals for the past 24 hrs:   BP Temp Temp src Pulse Heart Rate Resp Weight   07/14/18 0800 (!) 122/91 97.5  F (36.4  C) Oral - 83 16 -   07/14/18 0500 119/83 - - 76 - - 75.4 kg (166 lb 3.6 oz)   07/14/18 0102 121/81 - - - 62 16 -   07/13/18 2100 122/89 - - - 82 16 -   07/13/18 1814 111/79 - - - 67 16 -   07/13/18 1600 131/87 97.9  F (36.6  C) Oral - 67 16 -   07/13/18 1520 - - - - - 16 -   07/13/18 1206 126/86 - - - 77 16 -     Uterine fundus is firm, non-tender and at the level of the umbilicus    Intake/Output Summary (Last 24 hours) at 07/14/18 0941  Last data filed at 07/14/18 0823   Gross per 24 hour   Intake             3700 ml   Output             5100 ml   Net            -1400 ml               Data:     Cr .76 (.86)  ALT 55 (56)  AST 30  (36)  Plts 202 (212)    All laboratory data related to this surgery reviewed  Hemoglobin   Date Value Ref Range Status   07/06/2018 13.5 11.7 - 15.7 g/dL Final     No imaging studies have been ordered    Brianne Aguirre MD

## 2018-07-14 NOTE — PLAN OF CARE
Problem: Patient Care Overview  Goal: Plan of Care/Patient Progress Review  Outcome: Adequate for Discharge Date Met: 07/14/18  Patient bp's stable. Labetalol and procardia rx's sent.

## 2018-07-14 NOTE — DISCHARGE SUMMARY
[facility] Discharge Summary    Stefany Ricardo MRN# [id number]   Age: 28 year old YOB: 1990     Date of Admission:  7/12/2018  Date of Discharge::  7/14/2018  Admitting Physician:  Heather Tompkins MD  Discharge Physician:  Brianne Aguirre MD     Home clinic: [facility]          Admission Diagnoses:   Elevated blood pressure  Hypertension          Discharge Diagnosis:   Post partum preeclampsia w/severe features            Procedures:   Procedure(s): IV mag, antihypertensives       No other significant procedures performed during this admission           Medications Prior to Admission:     Prescriptions Prior to Admission   Medication Sig Dispense Refill Last Dose     buPROPion (WELLBUTRIN XL) 150 MG 24 hr tablet Take 1 tablet (150 mg) by mouth daily 90 tablet 3      calcium polycarbophil (FIBERCON) 625 MG tablet Take 2 tablets by mouth daily   7/5/2018 at Unknown time     Prenatal Vit-Fe Fumarate-FA (PRENATAL MULTIVITAMIN PLUS IRON) 27-0.8 MG TABS per tablet Take 1 tablet by mouth daily   7/5/2018 at Unknown time     VITAMIN D, CHOLECALCIFEROL, PO Take 5,000 Units by mouth daily   7/5/2018 at Unknown time             Discharge Medications:     Current Discharge Medication List      START taking these medications    Details   labetalol (NORMODYNE) 200 MG tablet Take 1 tablet (200 mg) by mouth every 8 hours  Qty: 60 tablet, Refills: 1    Associated Diagnoses: Hypertension, unspecified type      NIFEdipine ER osmotic (ADALAT CC) 30 MG TB24 Take 1 tablet (30 mg) by mouth daily  Qty: 30 tablet, Refills: 0    Associated Diagnoses: Hypertension, unspecified type         CONTINUE these medications which have NOT CHANGED    Details   buPROPion (WELLBUTRIN XL) 150 MG 24 hr tablet Take 1 tablet (150 mg) by mouth daily  Qty: 90 tablet, Refills: 3    Associated Diagnoses: Anxiety      calcium polycarbophil (FIBERCON) 625 MG tablet Take 2 tablets by mouth daily      Prenatal Vit-Fe Fumarate-FA (PRENATAL  MULTIVITAMIN PLUS IRON) 27-0.8 MG TABS per tablet Take 1 tablet by mouth daily      VITAMIN D, CHOLECALCIFEROL, PO Take 5,000 Units by mouth daily                   Consultations:   No consultations were requested during this admission              Hospital Course:   The patient's hospital course was unremarkable.  On discharge, her pain was well controlled. Vaginal bleeding is similar to peak menstrual flow.  Voiding without difficulty.  Ambulating well and tolerating a normal diet.  No fever.  Breastfeeding well.  Infant is stable. BPs controlled. Denies symptoms of preeclampsia    Post-partum hemoglobin:   Hemoglobin   Date Value Ref Range Status   07/06/2018 13.5 11.7 - 15.7 g/dL Final             Discharge Instructions and Follow-Up:   Discharge diet: Regular   Discharge activity: Pelvic rest: abstain from intercourse and do not use tampons for 6 week(s)   Discharge follow-up: Follow up with Dr. Can in 2-3 days   Wound care: Drink plenty of fluids  Ice to area for comfort  Keep wound clean and dry           Discharge Disposition:   Discharged to home      Attestation:  I have reviewed today's vital signs, notes, medications, labs and imaging.    Brianne Aguirre MD

## 2018-07-14 NOTE — DISCHARGE INSTRUCTIONS
Preeclampsia   Call your doctor right away if you have any of the following:  - Edema (swelling) in your face or hands  - Rapid weight gain-about 1 pound or more in a day  - Headache  - Abdominal pain on your right side  - Vision problems (flashes or spots)  - You have questions or concerns once you return home.

## 2019-03-09 ENCOUNTER — HEALTH MAINTENANCE LETTER (OUTPATIENT)
Age: 29
End: 2019-03-09

## 2019-08-15 NOTE — PLAN OF CARE
Patient was called and was able to schedule surgery.  Provided surgery date and post op dates and times.  Advised the nurse will call a day before surgery date to provide time of surgery and information regarding surgery.  Patient verbalized information given.      Problem: Patient Care Overview  Goal: Plan of Care/Patient Progress Review  Outcome: Improving  VSS, breastfeeding well- infant is cluster feeding, pain well controlled with Tylenol and Ibuprofen,  at bedside, interacting and bonding well with infant, encouraged to call with questions or concerns, will continue to monitor.

## 2020-02-17 ENCOUNTER — APPOINTMENT (OUTPATIENT)
Dept: GENERAL RADIOLOGY | Facility: CLINIC | Age: 30
End: 2020-02-17
Attending: EMERGENCY MEDICINE
Payer: COMMERCIAL

## 2020-02-17 ENCOUNTER — HOSPITAL ENCOUNTER (EMERGENCY)
Facility: CLINIC | Age: 30
Discharge: HOME | End: 2020-02-18
Attending: EMERGENCY MEDICINE | Admitting: EMERGENCY MEDICINE
Payer: COMMERCIAL

## 2020-02-17 DIAGNOSIS — B34.9 VIRAL SYNDROME: ICD-10-CM

## 2020-02-17 PROCEDURE — 40001204 ZZHCL STATISTIC STREP A RAPID: Performed by: EMERGENCY MEDICINE

## 2020-02-17 PROCEDURE — 99284 EMERGENCY DEPT VISIT MOD MDM: CPT | Mod: 25

## 2020-02-17 PROCEDURE — 71046 X-RAY EXAM CHEST 2 VIEWS: CPT

## 2020-02-17 PROCEDURE — 25000128 H RX IP 250 OP 636: Performed by: EMERGENCY MEDICINE

## 2020-02-17 PROCEDURE — 87651 STREP A DNA AMP PROBE: CPT | Performed by: EMERGENCY MEDICINE

## 2020-02-17 PROCEDURE — 96372 THER/PROPH/DIAG INJ SC/IM: CPT

## 2020-02-17 RX ORDER — CODEINE PHOSPHATE AND GUAIFENESIN 10; 100 MG/5ML; MG/5ML
1-2 SOLUTION ORAL EVERY 4 HOURS PRN
Qty: 100 ML | Refills: 0 | Status: SHIPPED | OUTPATIENT
Start: 2020-02-17 | End: 2020-05-20

## 2020-02-17 RX ORDER — KETOROLAC TROMETHAMINE 15 MG/ML
15 INJECTION, SOLUTION INTRAMUSCULAR; INTRAVENOUS ONCE
Status: COMPLETED | OUTPATIENT
Start: 2020-02-17 | End: 2020-02-17

## 2020-02-17 RX ORDER — BENZONATATE 200 MG/1
200 CAPSULE ORAL 3 TIMES DAILY PRN
Qty: 12 CAPSULE | Refills: 0 | Status: SHIPPED | OUTPATIENT
Start: 2020-02-17 | End: 2020-05-20

## 2020-02-17 RX ADMIN — KETOROLAC TROMETHAMINE 15 MG: 15 INJECTION, SOLUTION INTRAMUSCULAR; INTRAVENOUS at 22:01

## 2020-02-17 ASSESSMENT — MIFFLIN-ST. JEOR: SCORE: 1390.4

## 2020-02-17 NOTE — ED AVS SNAPSHOT
Emergency Department  [address] [facility] [address]  Phone:  [phone]  Fax:  [phone]                                    Stefany Ricardo   MRN: [id number]    Department:   Emergency Department   Date of Visit:  2/17/2020           After Visit Summary Signature Page    I have received my discharge instructions, and my questions have been answered. I have discussed any challenges I see with this plan with the nurse or doctor.    ..........................................................................................................................................  Patient/Patient Representative Signature      ..........................................................................................................................................  Patient Representative Print Name and Relationship to Patient    ..................................................               ................................................  Date                                   Time    ..........................................................................................................................................  Reviewed by Signature/Title    ...................................................              ..............................................  Date                                               Time          22EPIC Rev 08/18

## 2020-02-18 VITALS
WEIGHT: 150 LBS | HEIGHT: 64 IN | OXYGEN SATURATION: 97 % | HEART RATE: 84 BPM | TEMPERATURE: 98.9 F | RESPIRATION RATE: 18 BRPM | BODY MASS INDEX: 25.61 KG/M2 | DIASTOLIC BLOOD PRESSURE: 79 MMHG | SYSTOLIC BLOOD PRESSURE: 108 MMHG

## 2020-02-18 LAB
DEPRECATED S PYO AG THROAT QL EIA: NEGATIVE
SPECIMEN SOURCE: NORMAL
SPECIMEN SOURCE: NORMAL
STREP GROUP A PCR: NOT DETECTED

## 2020-02-18 ASSESSMENT — ENCOUNTER SYMPTOMS: FEVER: 1

## 2020-02-18 NOTE — ED NOTES
Pt reports she was exposed to Influenza last week, but reports she was afebrile for 10 days. Today pt reports fevers all day, chills, body aches, cough, chest tightness with cough and sore throat. Pt reports she called [facility] today due to cough and fever, and was ordered a Z pack, started at 1pm today.

## 2020-02-18 NOTE — ED TRIAGE NOTES
Pt reports 1 week of fever, body aches and chest pain while coughing, exposed to influenza B last week, declines ibuprofen in triage, took 2 tylenol extra strength tylenol in triage from home medications

## 2020-02-18 NOTE — ED PROVIDER NOTES
History     Chief Complaint:    Fever       HPI   Stefany Ricardo is a 29 year old female who presents with what appears to be a flulike illness.  She said chills and body aches on and off for the last 10 days, today came in because her fever was particularly high.  She took Tylenol and feels better now.  She does have a cough and cold, did see an online doctor and got azithromycin for this as well which he started taking yesterday.  Also endorses mild throat pain, otherwise she is able to do all of her ADLs, appears well-hydrated, no sick contacts apart from her  who also had influenza several days ago..    Allergies:  Amoxicillin     Medications:    benzonatate (TESSALON) 200 MG capsule  guaiFENesin-codeine (ROBITUSSIN AC) 100-10 MG/5ML solution  buPROPion (WELLBUTRIN XL) 150 MG 24 hr tablet  calcium polycarbophil (FIBERCON) 625 MG tablet  labetalol (NORMODYNE) 200 MG tablet  NIFEdipine ER osmotic (ADALAT CC) 30 MG TB24  Prenatal Vit-Fe Fumarate-FA (PRENATAL MULTIVITAMIN PLUS IRON) 27-0.8 MG TABS per tablet  VITAMIN D, CHOLECALCIFEROL, PO        Past Medical History:    Past Medical History:   Diagnosis Date     Complication of anesthesia      Hx of previous reproductive problem      Mental disorder        Patient Active Problem List    Diagnosis Date Noted     Hypertension 2018     Priority: Medium      (normal spontaneous vaginal delivery) 2018     Priority: Medium     Indication for care in labor or delivery 2018     Priority: Medium        Past Surgical History:    Past Surgical History:   Procedure Laterality Date     FOOT SURGERY Right      HC TOOTH EXTRACTION W/FORCEP          Family History:    family history is not on file.    Social History:   reports that she has never smoked. She has never used smokeless tobacco. She reports that she does not drink alcohol or use drugs.    PCP: Yanet Can     Review of Systems   Constitutional: Positive for fever.   All other  "systems reviewed and are negative.        Physical Exam     Patient Vitals for the past 24 hrs:   BP Temp Temp src Pulse Heart Rate Resp SpO2 Height Weight   02/17/20 2351 108/79 -- -- 84 -- -- 97 % -- --   02/17/20 2159 -- 98.9  F (37.2  C) -- -- -- -- 96 % -- --   02/17/20 2015 123/87 103.2  F (39.6  C) Oral -- 128 18 100 % 1.626 m (5' 4\") 68 kg (150 lb)        Physical Exam   Vitals: reviewed by me  General: Pt seen on [facility], [facility], cooperative, and alert to conversation, sick appearing, non toxic.    Eyes: Tracking well, clear conjunctiva BL  ENT: MMM, midline trachea.   Lungs: No tachypnea, no accessory muscle use. No respiratory distress.   CV: Rate as above, regular rhythm.    MSK: no peripheral edema or joint effusion.  No evidence of trauma  Skin: No rash, normal turgor and temperature  Neuro: Clear speech and no facial droop.  Psych: Not RIS, no e/o AH/      Emergency Department Course       Imaging:    XR Chest 2 Views   Preliminary Result   IMPRESSION: No evidence of active cardiopulmonary disease.            Laboratory:    Labs Ordered and Resulted from Time of ED Arrival Up to the Time of Departure from the ED   STREPTOCOCCUS A RAPID SCR W REFLX TO PCR   GROUP A STREPTOCOCCUS PCR THROAT SWAB          Impression & Plan      Medical Decision Making:  This is a very pleasant 29-year-old female presents emergency room with appears to be a viral syndrome.  It is unclear if she is had this flulike illness for 10 days straight, or if she got better now got worse again, but she did have a notable fever here today.  However she feels much improved after the Tylenol she took in triage, and has a negative strep throat test as well as a negative chest x-ray with no pneumonia.  She is already on azithromycin for possible walking pneumonia and I think she should continue this.  I think she probably does have the flu, and she is out of the window for Tamiflu, will plan for supportive therapy, she is " otherwise well-appearing, and I do think she stable for outpatient management.  Red flags when to come back were discussed, will give supportive management discharge home.    Diagnosis:    ICD-10-CM    1. Viral syndrome B34.9         Discharge Medications:  Discharge Medication List as of 2/17/2020 11:57 PM      START taking these medications    Details   benzonatate (TESSALON) 200 MG capsule Take 1 capsule (200 mg) by mouth 3 times daily as needed for cough, Disp-12 capsule, R-0, Local Print      guaiFENesin-codeine (ROBITUSSIN AC) 100-10 MG/5ML solution Take 5-10 mLs by mouth every 4 hours as needed for cough, Disp-100 mL, R-0, Local Print              2/18/2020   No att. providers found        Stanford Bryan MD  02/18/20 0035

## 2020-02-18 NOTE — RESULT ENCOUNTER NOTE
Group A Streptococcus PCR is NEGATIVE  No treatment or change in treatment [facility] ED lab result protocol - Strep protocol.

## 2020-03-11 ENCOUNTER — HEALTH MAINTENANCE LETTER (OUTPATIENT)
Age: 30
End: 2020-03-11

## 2020-05-20 ENCOUNTER — APPOINTMENT (OUTPATIENT)
Dept: MRI IMAGING | Facility: CLINIC | Age: 30
End: 2020-05-20
Attending: NURSE PRACTITIONER
Payer: COMMERCIAL

## 2020-05-20 ENCOUNTER — HOSPITAL ENCOUNTER (EMERGENCY)
Facility: CLINIC | Age: 30
Discharge: HOME | End: 2020-05-20
Attending: NURSE PRACTITIONER | Admitting: NURSE PRACTITIONER
Payer: COMMERCIAL

## 2020-05-20 VITALS
OXYGEN SATURATION: 100 % | HEIGHT: 64 IN | TEMPERATURE: 99.4 F | BODY MASS INDEX: 25.75 KG/M2 | DIASTOLIC BLOOD PRESSURE: 81 MMHG | RESPIRATION RATE: 18 BRPM | SYSTOLIC BLOOD PRESSURE: 120 MMHG | HEART RATE: 80 BPM

## 2020-05-20 DIAGNOSIS — R42 DIZZINESS: ICD-10-CM

## 2020-05-20 DIAGNOSIS — Z3A.01 LESS THAN 8 WEEKS GESTATION OF PREGNANCY: ICD-10-CM

## 2020-05-20 LAB
ALBUMIN SERPL-MCNC: 3.9 G/DL (ref 3.4–5)
ALBUMIN UR-MCNC: NEGATIVE MG/DL
ALP SERPL-CCNC: 59 U/L (ref 40–150)
ALT SERPL W P-5'-P-CCNC: 19 U/L (ref 0–50)
ANION GAP SERPL CALCULATED.3IONS-SCNC: 6 MMOL/L (ref 3–14)
APPEARANCE UR: CLEAR
AST SERPL W P-5'-P-CCNC: 9 U/L (ref 0–45)
B-HCG SERPL-ACNC: 141 IU/L (ref 0–5)
BACTERIA #/AREA URNS HPF: ABNORMAL /HPF
BASOPHILS # BLD AUTO: 0 10E9/L (ref 0–0.2)
BASOPHILS NFR BLD AUTO: 0 %
BILIRUB SERPL-MCNC: 0.4 MG/DL (ref 0.2–1.3)
BILIRUB UR QL STRIP: NEGATIVE
BUN SERPL-MCNC: 7 MG/DL (ref 7–30)
CALCIUM SERPL-MCNC: 9.2 MG/DL (ref 8.5–10.1)
CHLORIDE SERPL-SCNC: 105 MMOL/L (ref 94–109)
CO2 SERPL-SCNC: 26 MMOL/L (ref 20–32)
COLOR UR AUTO: YELLOW
CREAT SERPL-MCNC: 0.72 MG/DL (ref 0.52–1.04)
DIFFERENTIAL METHOD BLD: ABNORMAL
EOSINOPHIL # BLD AUTO: 0 10E9/L (ref 0–0.7)
EOSINOPHIL NFR BLD AUTO: 0.8 %
ERYTHROCYTE [DISTWIDTH] IN BLOOD BY AUTOMATED COUNT: 12.4 % (ref 10–15)
GFR SERPL CREATININE-BSD FRML MDRD: >90 ML/MIN/{1.73_M2}
GLUCOSE SERPL-MCNC: 85 MG/DL (ref 70–99)
GLUCOSE UR STRIP-MCNC: NEGATIVE MG/DL
HCT VFR BLD AUTO: 40.1 % (ref 35–47)
HGB BLD-MCNC: 13.8 G/DL (ref 11.7–15.7)
HGB UR QL STRIP: NEGATIVE
IMM GRANULOCYTES # BLD: 0 10E9/L (ref 0–0.4)
IMM GRANULOCYTES NFR BLD: 0.4 %
INTERPRETATION ECG - MUSE: NORMAL
KETONES UR STRIP-MCNC: NEGATIVE MG/DL
LEUKOCYTE ESTERASE UR QL STRIP: ABNORMAL
LYMPHOCYTES # BLD AUTO: 1.5 10E9/L (ref 0.8–5.3)
LYMPHOCYTES NFR BLD AUTO: 29.2 %
MAGNESIUM SERPL-MCNC: 1.9 MG/DL (ref 1.6–2.3)
MCH RBC QN AUTO: 29.7 PG (ref 26.5–33)
MCHC RBC AUTO-ENTMCNC: 34.4 G/DL (ref 31.5–36.5)
MCV RBC AUTO: 86 FL (ref 78–100)
MONOCYTES # BLD AUTO: 0.5 10E9/L (ref 0–1.3)
MONOCYTES NFR BLD AUTO: 10.4 %
MUCOUS THREADS #/AREA URNS LPF: PRESENT /LPF
NEUTROPHILS # BLD AUTO: 3 10E9/L (ref 1.6–8.3)
NEUTROPHILS NFR BLD AUTO: 59.2 %
NITRATE UR QL: NEGATIVE
NRBC # BLD AUTO: 0 10*3/UL
NRBC BLD AUTO-RTO: 0 /100
PH UR STRIP: 6.5 PH (ref 5–7)
PLATELET # BLD AUTO: 148 10E9/L (ref 150–450)
POTASSIUM SERPL-SCNC: 3.9 MMOL/L (ref 3.4–5.3)
PROT SERPL-MCNC: 7.3 G/DL (ref 6.8–8.8)
RBC # BLD AUTO: 4.65 10E12/L (ref 3.8–5.2)
RBC #/AREA URNS AUTO: 1 /HPF (ref 0–2)
SODIUM SERPL-SCNC: 137 MMOL/L (ref 133–144)
SOURCE: ABNORMAL
SP GR UR STRIP: 1.01 (ref 1–1.03)
SQUAMOUS #/AREA URNS AUTO: 1 /HPF (ref 0–1)
TROPONIN I SERPL-MCNC: <0.015 UG/L (ref 0–0.04)
TSH SERPL DL<=0.005 MIU/L-ACNC: 2.98 MU/L (ref 0.4–4)
UROBILINOGEN UR STRIP-MCNC: NORMAL MG/DL (ref 0–2)
WBC # BLD AUTO: 5.1 10E9/L (ref 4–11)
WBC #/AREA URNS AUTO: 3 /HPF (ref 0–5)

## 2020-05-20 PROCEDURE — 84484 ASSAY OF TROPONIN QUANT: CPT | Performed by: NURSE PRACTITIONER

## 2020-05-20 PROCEDURE — 84443 ASSAY THYROID STIM HORMONE: CPT | Performed by: NURSE PRACTITIONER

## 2020-05-20 PROCEDURE — 93005 ELECTROCARDIOGRAM TRACING: CPT

## 2020-05-20 PROCEDURE — 85025 COMPLETE CBC W/AUTO DIFF WBC: CPT | Performed by: NURSE PRACTITIONER

## 2020-05-20 PROCEDURE — 83735 ASSAY OF MAGNESIUM: CPT | Performed by: NURSE PRACTITIONER

## 2020-05-20 PROCEDURE — 96361 HYDRATE IV INFUSION ADD-ON: CPT

## 2020-05-20 PROCEDURE — 80053 COMPREHEN METABOLIC PANEL: CPT | Performed by: NURSE PRACTITIONER

## 2020-05-20 PROCEDURE — 25800030 ZZH RX IP 258 OP 636: Performed by: NURSE PRACTITIONER

## 2020-05-20 PROCEDURE — 99285 EMERGENCY DEPT VISIT HI MDM: CPT | Mod: 25

## 2020-05-20 PROCEDURE — 96360 HYDRATION IV INFUSION INIT: CPT

## 2020-05-20 PROCEDURE — 81001 URINALYSIS AUTO W/SCOPE: CPT | Performed by: NURSE PRACTITIONER

## 2020-05-20 PROCEDURE — 84702 CHORIONIC GONADOTROPIN TEST: CPT | Performed by: NURSE PRACTITIONER

## 2020-05-20 PROCEDURE — 25000132 ZZH RX MED GY IP 250 OP 250 PS 637: Performed by: NURSE PRACTITIONER

## 2020-05-20 PROCEDURE — 87086 URINE CULTURE/COLONY COUNT: CPT | Performed by: NURSE PRACTITIONER

## 2020-05-20 PROCEDURE — 70551 MRI BRAIN STEM W/O DYE: CPT

## 2020-05-20 RX ORDER — MECLIZINE HCL 12.5 MG 12.5 MG/1
12.5 TABLET ORAL ONCE
Status: COMPLETED | OUTPATIENT
Start: 2020-05-20 | End: 2020-05-20

## 2020-05-20 RX ORDER — MECLIZINE HCL 12.5 MG 12.5 MG/1
12.5 TABLET ORAL 4 TIMES DAILY PRN
Qty: 30 TABLET | Refills: 0 | Status: ON HOLD | OUTPATIENT
Start: 2020-05-20 | End: 2020-10-19

## 2020-05-20 RX ADMIN — MECLIZINE 12.5 MG: 12.5 TABLET ORAL at 14:54

## 2020-05-20 RX ADMIN — SODIUM CHLORIDE 1000 ML: 9 INJECTION, SOLUTION INTRAVENOUS at 12:20

## 2020-05-20 ASSESSMENT — ENCOUNTER SYMPTOMS
FATIGUE: 0
SORE THROAT: 0
ABDOMINAL PAIN: 0
PALPITATIONS: 0
CHILLS: 0
FEVER: 0
SHORTNESS OF BREATH: 0
RHINORRHEA: 0
DIZZINESS: 1

## 2020-05-20 NOTE — ED TRIAGE NOTES
Pt reports dizziness since Friday. Pt states fell two days ago d/t the dizziness and was unable to sleep d/t feeling like was going to fall out of bed last noc. Pt 5 week pregnant

## 2020-05-20 NOTE — ED AVS SNAPSHOT
Emergency Department  [address] [facility] [address]  Phone:  [phone]  Fax:  [phone]                                    Stefany Ricardo   MRN: [id number]    Department:   Emergency Department   Date of Visit:  5/20/2020           After Visit Summary Signature Page    I have received my discharge instructions, and my questions have been answered. I have discussed any challenges I see with this plan with the nurse or doctor.    ..........................................................................................................................................  Patient/Patient Representative Signature      ..........................................................................................................................................  Patient Representative Print Name and Relationship to Patient    ..................................................               ................................................  Date                                   Time    ..........................................................................................................................................  Reviewed by Signature/Title    ...................................................              ..............................................  Date                                               Time          22EPIC Rev 08/18

## 2020-05-20 NOTE — ED PROVIDER NOTES
"  History     Chief Complaint:  Dizziness    The history is provided by the patient.      Stefany Ricardo is a 30 year old,  5 week pregnant, female who presents for evaluation of \"room spinning\" dizziness for 5 days.  Stefany reports that when she lays flat her dizziness symptoms are worst, and sits up with relief of symptoms. She also endorses that when she looks right, her dizziness gets worse.  She denies similar symptoms in the past.  She denies fever, headache, neck or back pain, trauma, vision or hearing changes, unilateral or focal numbness or weakness, gait difficulty.  She denies any recent sickness, however is concerned that she may have been exposed while she was here in February for evaluation flue-like symptoms. Stefany denies any vaginal bleeding or discharge, pelvic pain, abdominal pain. Regarding her pregnancy, she has not had any prenatal visits yet, however she scheduled one recently.     Allergies:  Amoxicillin     Medications:    benzonatate  bubropion  calcium polycarbophil  labetalol  nifedipine ER osmotic  Prenatal Vit-Fe Fumarate-FA  VITAMIN D, CHOLECALCIFEROL,     Past Medical History:    Hypertension  Reproductive problem  Mental disorder    Past Surgical History:    Foot surgery  Tooth extraction    Family History:    No past pertinent family history.    Social History:  Tobacco use: Never smoker  Alcohol use: no  Drug use: no  Marital Status:   [2]     Review of Systems   Constitutional: Negative for chills, fatigue and fever.   HENT: Negative for congestion, rhinorrhea and sore throat.    Eyes: Positive for visual disturbance.   Respiratory: Negative for shortness of breath.    Cardiovascular: Negative for chest pain and palpitations.   Gastrointestinal: Negative for abdominal pain.   Genitourinary: Negative for vaginal bleeding, vaginal discharge and vaginal pain.   Neurological: Positive for dizziness.   All other systems reviewed and are negative.    Physical Exam     Patient " "Vitals for the past 24 hrs:   BP Temp Temp src Pulse Resp SpO2 Height   05/20/20 1152 120/81 99.4  F (37.4  C) Oral 80 18 100 % 1.626 m (5' 4\")     Orthostatic vital signs  Lying 105/74  HR 75  Standing 121/84HR 82    Physical Exam  Nursing notes reviewed. Vitals reviewed.  General: Alert. Well kept.  Eyes:  Conjunctiva non-injected, non-icteric. Horizontal nystagmus to the right.   Neck/Throat: Moist mucous membranes. Normal voice.  Cardiac: Regular rhythm. Normal heart sounds.  Pulmonary: Clear and equal breath sounds bilaterally.   Abdomen: soft and non-tender.  Musculoskeletal: Normal gross range of motion of all 4 extremities.   Skin: Warm and dry. Normal appearance of visualized exposed skin without rashes or petechiae.  Psych: Affect normal. Good eye contact.  Neurological:  Mental: alert and oriented x 4, follows commands, no aphasia or dysarthria.   Cranial Nerves:  CN II: visual acuity - able to accurately count fingers with each eye. Visual fields intact   CN III, IV, VI: EOM intact, pupils equal and reactive  CN V: facial sensation nl  CN VII: face symmetric, no facial droop  CN VIII: hearing normal  CN IX: palate elevation symmetric, uvula at midline  CN XI SCM normal, shoulder shrug nl  CN XII: tongue midline  Motor: Strength: 5/5 in all major groups of all extremities. Normal tone. No abnormal movements. No pronator drift b/l.  Reflexes:  No clonus noted. Toes are down-going b/l.   Sensory: temperature, light touch intact.   Coordination: FNF and heel-shin tests intact b/l.   Gait:  Normal.    Emergency Department Course     ECG:  Indication: Chest Pain equivalent  Time: 1226  Vent. Rate 68 bpm. CA interval 140. QRS duration 92. QT/QTc 358/380. P-R-T axis 39 33 54  Normal sinus rhythm with sinus arrhythmia  Incomplete right bundle branch block  Borderline EKG. No prior for comparison. Read time: 1231 by Vic Joel MD and Emilia Marcus DNP       Imaging:  Radiology findings were communicated " with the patient who voiced understanding of the findings.    MR Brain w/o Contrast  IMPRESSION: Normal MRI of the brain.   Reading per Radiology    Laboratory:  Laboratory findings were communicated with the patient who voiced understanding of the findings.    CBC: WBC: 5.1, HGB: 13.8, PLT: 148 (low)  CMP: Glucose 85, o/w WNL (Creatinine: 0.72)  Troponin I (Collected at 1217): <0.015  HC (high)  Magnesium: 1.9  TSH: 2.98    UA with Microscopic: Leukocyte Esterase: Large (A), Bacteria: Few (A), and Mucous: Present (A), o/w WNL    Interventions:  1220 NS 1L IV   1454 Antivert 12.5 mg PO    Emergency Department Course:  Past medical records, nursing notes, and vitals reviewed.    1201 I performed an exam of the patient as documented above.     EKG obtained in the ED, see results above.   IV was inserted and blood was drawn for laboratory testing, results above.  The patient provided a urine sample here in the emergency department. This was sent for laboratory testing, findings above.  The patient was sent for a MRI Brain for Stroke while in the emergency department, results above.     1458 I rechecked the patient and discussed the results of her workup thus far including plans for discharge.  She notes improvement of symptoms following treatment with IV fluids.    Findings and plan explained to the Patient. Patient discharged home with instructions regarding supportive care, medications, and reasons to return. The importance of close follow-up was reviewed. The patient was prescribed Antivert    I personally reviewed the laboratory and imaging results with the Patient and answered all related questions prior to discharge.     Impression & Plan   Medical Decision Making:  Stefany Ricardo is a 30 year old female who presents for evaluation of dizziness. The differential diagnosis of dizziness is broad and includes common etiologies such as menieres disease, labyrinthitis, benign positional vertigo, otitis media,  etc.  More serious etiologies considered include central etiologies such as tumor, intracerebral bleed, dissection, ischemic cerebral vascular accident.  The history, physical exam including detailed neurologic exam, and workup in the emergency room suggests a benign cause of vertigo today.  NIHSS is 0.  She is not orthostatic. The patient has reproducible nystagmus to the right and with position changes and relief with sitting or turning to the left.  MRI obtained and shows no signs of acute stroke or intracerebral bleeding.  Symptoms are not consistent with vertebral dissection or posterior circulation stroke with no neck pain, headache, or neurologic deficit. Patient feels improved after interventions noted above.  No indication for admission.  Further outpatient management is indicated with vertigo medications.   hCG returns 151 and there is no indication for need of prenatal testing today.  She will follow-up with OB tomorrow and was given a primary care referral for the next 2-3 days.  She was given strict return instructions including worsening symptoms, fever, headache, focal numbness or weakness.    Diagnosis:    ICD-10-CM    1. Dizziness  R42    2. Less than 8 weeks gestation of pregnancy  Z3A.01        Disposition:  Discharged to home.    Discharge Medications:  New Prescriptions    MECLIZINE (ANTIVERT) 12.5 MG TABLET    Take 1 tablet (12.5 mg) by mouth 4 times daily as needed for dizziness       Scribe Disclosure:  I, Lukasz mSith, am serving as a scribe at 11:43 AM on 5/20/2020 to document services personally performed by Emilia Marcus DNP based on my observations and the provider's statements to me. 5/20/2020    EMERGENCY DEPARTMENT       Emilia Marcus CNP  05/20/20 2034

## 2020-05-21 LAB
BACTERIA SPEC CULT: NORMAL
Lab: NORMAL
SPECIMEN SOURCE: NORMAL

## 2020-06-10 LAB
ABO + RH BLD: NORMAL
ABO + RH BLD: NORMAL
BLD GP AB SCN SERPL QL: NEGATIVE
HBV SURFACE AG SERPL QL IA: NEGATIVE
HIV 1+2 AB+HIV1 P24 AG SERPL QL IA: NON REACTIVE
RUBELLA ABY IGG: NORMAL
TREPONEMA ANTIBODIES: NORMAL

## 2020-10-19 ENCOUNTER — HOSPITAL ENCOUNTER (OUTPATIENT)
Facility: CLINIC | Age: 30
Discharge: HOME | End: 2020-10-20
Attending: OBSTETRICS & GYNECOLOGY | Admitting: OBSTETRICS & GYNECOLOGY
Payer: COMMERCIAL

## 2020-10-19 PROCEDURE — G0463 HOSPITAL OUTPT CLINIC VISIT: HCPCS | Mod: 25

## 2020-10-19 PROCEDURE — 250N000013 HC RX MED GY IP 250 OP 250 PS 637: Performed by: OBSTETRICS & GYNECOLOGY

## 2020-10-19 PROCEDURE — 36415 COLL VENOUS BLD VENIPUNCTURE: CPT | Performed by: OBSTETRICS & GYNECOLOGY

## 2020-10-19 PROCEDURE — 59025 FETAL NON-STRESS TEST: CPT

## 2020-10-19 RX ORDER — ACETAMINOPHEN 325 MG/1
975 TABLET ORAL EVERY 6 HOURS PRN
Status: DISCONTINUED | OUTPATIENT
Start: 2020-10-19 | End: 2020-10-20 | Stop reason: HOSPADM

## 2020-10-19 RX ORDER — LEVOTHYROXINE SODIUM 25 UG/1
25 TABLET ORAL DAILY
COMMUNITY

## 2020-10-19 RX ORDER — ACETAMINOPHEN 325 MG/1
TABLET ORAL
Status: DISCONTINUED
Start: 2020-10-19 | End: 2020-10-20 | Stop reason: HOSPADM

## 2020-10-19 RX ORDER — ASPIRIN 81 MG/1
81 TABLET, CHEWABLE ORAL DAILY
Status: ON HOLD | COMMUNITY
End: 2021-01-14

## 2020-10-19 RX ADMIN — ACETAMINOPHEN 975 MG: 325 TABLET, FILM COATED ORAL at 22:31

## 2020-10-20 ENCOUNTER — HOSPITAL ENCOUNTER (OUTPATIENT)
Facility: CLINIC | Age: 30
End: 2020-10-20
Admitting: OBSTETRICS & GYNECOLOGY
Payer: COMMERCIAL

## 2020-10-20 VITALS — SYSTOLIC BLOOD PRESSURE: 113 MMHG | TEMPERATURE: 99 F | DIASTOLIC BLOOD PRESSURE: 64 MMHG

## 2020-10-20 LAB — HGB F BLD QL KLEIH BETKE: NORMAL

## 2020-10-20 PROCEDURE — 85460 HEMOGLOBIN FETAL: CPT | Performed by: OBSTETRICS & GYNECOLOGY

## 2020-10-20 NOTE — PLAN OF CARE
Patient arrived to maternal assessment center ambulatory, unaccompanied.    Patient reports reason for visit is fell at home down 8 staris, landing on butt.  She has noticed increased soreness on right side and groin area and scrape on right elbow.  Patient to MAC room 3 to change into gown.      G 3 P 1     26 weeks 4 days gestation    Prenatal record reviewed.        Verbal consent for EFM.     EFM applied for fetal well being after fall.    Uterine assessment completed, non-tender and palpates soft.      Patient reports fetal movement is present.  Patient denies vaginal discharge, pelvic pressure, UTI symptoms, GI problems, bloody show, vaginal bleeding, edema, headache, visual disturbances, epigastric or URQ pain, and/or rupture of membranes.      Triage/Arrival assessment completed.       Dr Aguirre paged with return call received.  Update included but not limited to: Patients arrival, patient presents to maternal assessment center after fall down 8 stairs.  She is a .  26 4/7 gestation.  Fetal heart tones with a 145 baseline, accelerations present, no decelerations noted.  No contractions noted.  Patient is reporting generalized soreness and discomfort 'like a bruise' in right lower abdomen.  Kleihauer Bettke has been collected and sent - usually not resulted when 4 hours of monitoring is complete.  Dr Aguirre states she will see to it patient is updated if positive result.  Order received for acetaminophen and to discharge if no concerns with fetal heart tones or uterine contractions with routine follow-up.       0055  Patient given discharge instructions verbalizes understanding and patient signed after visit summary signature page.  Patient in agreement with plan.  Patient instructed to report change in fetal movement, vaginal leaking of fluid or bleeding, abdominal pain, or any concerns related to the pregnancy to her physician.  Monitors removed for patient to dress.     0105 Discharged  home, undelivered, ambulatory at 0105.

## 2020-10-20 NOTE — DISCHARGE INSTRUCTIONS
Discharge Instruction for Undelivered Patients      You were seen for: Fetal Assessment  We Consulted: Dr DON Aguirre  You had (Test or Medicine):fetal monitoring (non-stress test), blood test     Diet:   Drink 8 to 12 glasses of liquids (milk, juice, water) every day.  You may eat meals and snacks.     Activity:  Call your doctor or nurse midwife if your baby is moving less than usual.     Call your provider if you notice:  *Swelling in your face or increased swelling in your hands or legs.  *Headaches that are not relieved by Tylenol (acetaminophen).  *Changes in your vision (blurring: seeing spots or stars.)  *Nausea (sick to your stomach) and vomiting (throwing up).   *Weight gain of 5 pounds or more per week.  *Heartburn that doesn't go away.  Signs of bladder infection: pain when you urinate (use the toilet), need to go more often and more urgently.  The bag of arredondo (rupture of membranes) breaks, or you notice leaking in your underwear.  Bright red blood in your underwear.  Abdominal (lower belly) or stomach pain.  Second (plus) baby: Contractions (tightening) less than 10 minutes apart and getting stronger.  *If less than 34 weeks: Contractions (tightenings) more than 6 times in one hour.  Increase or change in vaginal discharge (note the color and amount)      Follow-up:  As scheduled in the clinic

## 2020-10-22 LAB — TREPONEMA ANTIBODIES: NONREACTIVE

## 2020-12-15 LAB — GROUP B STREP PCR: NEGATIVE

## 2020-12-24 ENCOUNTER — APPOINTMENT (OUTPATIENT)
Dept: ULTRASOUND IMAGING | Facility: CLINIC | Age: 30
End: 2020-12-24
Attending: OBSTETRICS & GYNECOLOGY
Payer: COMMERCIAL

## 2020-12-24 ENCOUNTER — HOSPITAL ENCOUNTER (OUTPATIENT)
Facility: CLINIC | Age: 30
Discharge: HOME | End: 2020-12-24
Attending: OBSTETRICS & GYNECOLOGY | Admitting: OBSTETRICS & GYNECOLOGY
Payer: COMMERCIAL

## 2020-12-24 PROCEDURE — 59025 FETAL NON-STRESS TEST: CPT

## 2020-12-24 PROCEDURE — G0463 HOSPITAL OUTPT CLINIC VISIT: HCPCS | Mod: 25

## 2020-12-24 PROCEDURE — 76819 FETAL BIOPHYS PROFIL W/O NST: CPT

## 2020-12-24 NOTE — DISCHARGE INSTRUCTIONS
Discharge Instruction for Undelivered Patients      You were seen for: NST and BPP for GDM  We Consulted: Dr Castillo  You had (Test or Medicine):NST and BPP    Diet:   As tolerated     Activity:  As tolerated    Call your provider if you notice:  Swelling in your face or increased swelling in your hands or legs.  Headaches that are not relieved by Tylenol (acetaminophen).  Changes in your vision (blurring: seeing spots or stars.)  Nausea (sick to your stomach) and vomiting (throwing up).   Weight gain of 5 pounds or more per week.  Heartburn that doesn't go away.  Signs of bladder infection: pain when you urinate (use the toilet), need to go more often and more urgently.  The bag of arredondo (rupture of membranes) breaks, or you notice leaking in your underwear.  Bright red blood in your underwear.  Abdominal (lower belly) or stomach pain.    Second (plus) baby: Contractions (tightening) less than 10 minutes apart and getting stronger.  *If less than 34 weeks: Contractions (tightenings) more than 6 times in one hour.  Increase or change in vaginal discharge (note the color and amount)  Other:Call for any other questions or concerns  Follow-up:  As scheduled in the clinic

## 2020-12-30 ENCOUNTER — HOSPITAL ENCOUNTER (OUTPATIENT)
Facility: CLINIC | Age: 30
Discharge: HOME | End: 2020-12-30
Attending: SPECIALIST | Admitting: SPECIALIST
Payer: COMMERCIAL

## 2020-12-30 VITALS
RESPIRATION RATE: 16 BRPM | BODY MASS INDEX: 31.24 KG/M2 | TEMPERATURE: 98.1 F | HEIGHT: 64 IN | WEIGHT: 183 LBS | SYSTOLIC BLOOD PRESSURE: 128 MMHG | DIASTOLIC BLOOD PRESSURE: 67 MMHG

## 2020-12-30 PROBLEM — O26.90 PREGNANCY RELATED CONDITION: Status: ACTIVE | Noted: 2020-12-30

## 2020-12-30 PROCEDURE — G0463 HOSPITAL OUTPT CLINIC VISIT: HCPCS | Mod: 25

## 2020-12-30 PROCEDURE — 59025 FETAL NON-STRESS TEST: CPT

## 2020-12-30 RX ORDER — ONDANSETRON 2 MG/ML
4 INJECTION INTRAMUSCULAR; INTRAVENOUS EVERY 6 HOURS PRN
Status: DISCONTINUED | OUTPATIENT
Start: 2020-12-30 | End: 2020-12-30 | Stop reason: HOSPADM

## 2020-12-30 ASSESSMENT — MIFFLIN-ST. JEOR: SCORE: 1535.08

## 2020-12-30 NOTE — PLAN OF CARE
36+6 week pt of dr. Castillo ambulates to [facility] from home stating she has not had good normal movement in the last 5-6 hours.  States she has GDIC.  EUM/US explained and applied with pt permission.  Admission data base obtained.  POC reviewed.  Support given.

## 2020-12-31 NOTE — DISCHARGE INSTRUCTIONS
Discharge Instruction for Undelivered Patients      You were seen for: Fetal Assessment  We Consulted: Dr. Diane  You had (Test or Medicine):NST (non stress test)     Diet:   Drink 8 to 12 glasses of liquids (milk, juice, water) every day.  You may eat meals and snacks.  To manager your diabetes, follow the guidelines for eating and drinking given to you by your Clinic Provider or Diabetes Educator.       Activity:  Count fetal kicks everyday (see handout)  Call your doctor or nurse midwife if your baby is moving less than usual.     Call your provider if you notice:  Swelling in your face or increased swelling in your hands or legs.  Headaches that are not relieved by Tylenol (acetaminophen).  Changes in your vision (blurring: seeing spots or stars.)  Nausea (sick to your stomach) and vomiting (throwing up).   Weight gain of 5 pounds or more per week.  Heartburn that doesn't go away.  Signs of bladder infection: pain when you urinate (use the toilet), need to go more often and more urgently.  The bag of arredondo (rupture of membranes) breaks, or you notice leaking in your underwear.  Bright red blood in your underwear.  Abdominal (lower belly) or stomach pain.  For first baby: Contractions (tightening) less than 5 minutes apart for one hour or more.  Second (plus) baby: Contractions (tightening) less than 10 minutes apart and getting stronger.  *If less than 34 weeks: Contractions (tightenings) more than 6 times in one hour.  Increase or change in vaginal discharge (note the color and amount)  Other:     Follow-up:  As scheduled in the clinic tomorrow  [facility] # [phone]

## 2020-12-31 NOTE — PROVIDER NOTIFICATION
12/30/20 1817   Provider Notification   Provider Name/Title [place]   Method of Notification Electronic Page   immediate return of page.  MD aware of pt arrival.  Informed of FHT's baseline 140bpm with accels to 160/170, moderate variability, no decels.  GDIC, 36+6 weeks has appointment and BPP tomorrow.  Orders to discharge home and F/U tomorrow in office.

## 2021-01-03 ENCOUNTER — HEALTH MAINTENANCE LETTER (OUTPATIENT)
Age: 31
End: 2021-01-03

## 2021-01-12 DIAGNOSIS — Z20.822 ENCOUNTER FOR LABORATORY TESTING FOR COVID-19 VIRUS: Primary | ICD-10-CM

## 2021-01-12 DIAGNOSIS — Z20.822 ENCOUNTER FOR LABORATORY TESTING FOR COVID-19 VIRUS: ICD-10-CM

## 2021-01-12 LAB
SARS-COV-2 RNA RESP QL NAA+PROBE: NORMAL
SPECIMEN SOURCE: NORMAL

## 2021-01-13 LAB
LABORATORY COMMENT REPORT: NORMAL
SARS-COV-2 RNA RESP QL NAA+PROBE: NEGATIVE
SPECIMEN SOURCE: NORMAL

## 2021-01-14 ENCOUNTER — HOSPITAL ENCOUNTER (INPATIENT)
Facility: CLINIC | Age: 31
LOS: 3 days | Discharge: HOME | End: 2021-01-17
Attending: OBSTETRICS & GYNECOLOGY | Admitting: SPECIALIST
Payer: COMMERCIAL

## 2021-01-14 PROBLEM — O24.419 GDM, CLASS A2: Status: ACTIVE | Noted: 2021-01-14

## 2021-01-14 LAB
ABO + RH BLD: NORMAL
ABO + RH BLD: NORMAL
HGB BLD-MCNC: 13.7 G/DL (ref 11.7–15.7)
PLATELET # BLD AUTO: 180 10E9/L (ref 150–450)
SPECIMEN EXP DATE BLD: NORMAL

## 2021-01-14 PROCEDURE — 85049 AUTOMATED PLATELET COUNT: CPT | Performed by: OBSTETRICS & GYNECOLOGY

## 2021-01-14 PROCEDURE — 86900 BLOOD TYPING SEROLOGIC ABO: CPT | Performed by: OBSTETRICS & GYNECOLOGY

## 2021-01-14 PROCEDURE — 36415 COLL VENOUS BLD VENIPUNCTURE: CPT | Performed by: OBSTETRICS & GYNECOLOGY

## 2021-01-14 PROCEDURE — 250N000013 HC RX MED GY IP 250 OP 250 PS 637: Performed by: OBSTETRICS & GYNECOLOGY

## 2021-01-14 PROCEDURE — 120N000001 HC R&B MED SURG/OB

## 2021-01-14 PROCEDURE — 86901 BLOOD TYPING SEROLOGIC RH(D): CPT | Performed by: OBSTETRICS & GYNECOLOGY

## 2021-01-14 PROCEDURE — 85018 HEMOGLOBIN: CPT | Performed by: OBSTETRICS & GYNECOLOGY

## 2021-01-14 PROCEDURE — 99207 PR NO CHARGE LOS: CPT | Performed by: PHYSICIAN ASSISTANT

## 2021-01-14 PROCEDURE — 86780 TREPONEMA PALLIDUM: CPT | Performed by: OBSTETRICS & GYNECOLOGY

## 2021-01-14 RX ORDER — OXYTOCIN 10 [USP'U]/ML
10 INJECTION, SOLUTION INTRAMUSCULAR; INTRAVENOUS
Status: DISCONTINUED | OUTPATIENT
Start: 2021-01-14 | End: 2021-01-15

## 2021-01-14 RX ORDER — TERBUTALINE SULFATE 1 MG/ML
0.25 INJECTION, SOLUTION SUBCUTANEOUS
Status: DISCONTINUED | OUTPATIENT
Start: 2021-01-14 | End: 2021-01-15

## 2021-01-14 RX ORDER — ACETAMINOPHEN 325 MG/1
650 TABLET ORAL EVERY 4 HOURS PRN
Status: DISCONTINUED | OUTPATIENT
Start: 2021-01-14 | End: 2021-01-15

## 2021-01-14 RX ORDER — NALOXONE HYDROCHLORIDE 0.4 MG/ML
0.4 INJECTION, SOLUTION INTRAMUSCULAR; INTRAVENOUS; SUBCUTANEOUS
Status: DISCONTINUED | OUTPATIENT
Start: 2021-01-14 | End: 2021-01-15

## 2021-01-14 RX ORDER — TRANEXAMIC ACID 10 MG/ML
1 INJECTION, SOLUTION INTRAVENOUS EVERY 30 MIN PRN
Status: DISCONTINUED | OUTPATIENT
Start: 2021-01-14 | End: 2021-01-15

## 2021-01-14 RX ORDER — SODIUM CHLORIDE, SODIUM LACTATE, POTASSIUM CHLORIDE, CALCIUM CHLORIDE 600; 310; 30; 20 MG/100ML; MG/100ML; MG/100ML; MG/100ML
INJECTION, SOLUTION INTRAVENOUS CONTINUOUS
Status: DISCONTINUED | OUTPATIENT
Start: 2021-01-14 | End: 2021-01-15

## 2021-01-14 RX ORDER — LEVOTHYROXINE SODIUM 25 UG/1
25 TABLET ORAL ONCE
Status: COMPLETED | OUTPATIENT
Start: 2021-01-14 | End: 2021-01-14

## 2021-01-14 RX ORDER — NALOXONE HYDROCHLORIDE 0.4 MG/ML
0.2 INJECTION, SOLUTION INTRAMUSCULAR; INTRAVENOUS; SUBCUTANEOUS
Status: DISCONTINUED | OUTPATIENT
Start: 2021-01-14 | End: 2021-01-15

## 2021-01-14 RX ORDER — DEXTROSE MONOHYDRATE 25 G/50ML
25-50 INJECTION, SOLUTION INTRAVENOUS
Status: DISCONTINUED | OUTPATIENT
Start: 2021-01-14 | End: 2021-01-15

## 2021-01-14 RX ORDER — ZOLPIDEM TARTRATE 5 MG/1
5 TABLET ORAL ONCE
Status: COMPLETED | OUTPATIENT
Start: 2021-01-14 | End: 2021-01-14

## 2021-01-14 RX ORDER — CALCIUM CARBONATE 500 MG/1
1000 TABLET, CHEWABLE ORAL EVERY 4 HOURS PRN
Status: DISCONTINUED | OUTPATIENT
Start: 2021-01-14 | End: 2021-01-15

## 2021-01-14 RX ORDER — OXYTOCIN/0.9 % SODIUM CHLORIDE 30/500 ML
100-340 PLASTIC BAG, INJECTION (ML) INTRAVENOUS CONTINUOUS PRN
Status: DISCONTINUED | OUTPATIENT
Start: 2021-01-14 | End: 2021-01-15

## 2021-01-14 RX ORDER — NICOTINE POLACRILEX 4 MG
15-30 LOZENGE BUCCAL
Status: DISCONTINUED | OUTPATIENT
Start: 2021-01-14 | End: 2021-01-15

## 2021-01-14 RX ORDER — METHYLERGONOVINE MALEATE 0.2 MG/ML
200 INJECTION INTRAVENOUS
Status: DISCONTINUED | OUTPATIENT
Start: 2021-01-14 | End: 2021-01-15

## 2021-01-14 RX ORDER — IBUPROFEN 400 MG/1
800 TABLET, FILM COATED ORAL
Status: DISCONTINUED | OUTPATIENT
Start: 2021-01-14 | End: 2021-01-15

## 2021-01-14 RX ORDER — CARBOPROST TROMETHAMINE 250 UG/ML
250 INJECTION, SOLUTION INTRAMUSCULAR
Status: DISCONTINUED | OUTPATIENT
Start: 2021-01-14 | End: 2021-01-15

## 2021-01-14 RX ORDER — SODIUM CHLORIDE 9 MG/ML
INJECTION, SOLUTION INTRAVENOUS CONTINUOUS
Status: DISCONTINUED | OUTPATIENT
Start: 2021-01-14 | End: 2021-01-15

## 2021-01-14 RX ORDER — OXYCODONE AND ACETAMINOPHEN 5; 325 MG/1; MG/1
1 TABLET ORAL
Status: DISCONTINUED | OUTPATIENT
Start: 2021-01-14 | End: 2021-01-15

## 2021-01-14 RX ORDER — BUPROPION HYDROCHLORIDE 150 MG/1
150 TABLET ORAL DAILY
Status: DISCONTINUED | OUTPATIENT
Start: 2021-01-15 | End: 2021-01-15

## 2021-01-14 RX ORDER — MISOPROSTOL 100 UG/1
25 TABLET ORAL
Status: DISCONTINUED | OUTPATIENT
Start: 2021-01-14 | End: 2021-01-14

## 2021-01-14 RX ORDER — BUPROPION HYDROCHLORIDE 150 MG/1
150 TABLET ORAL EVERY MORNING
COMMUNITY

## 2021-01-14 RX ORDER — ONDANSETRON 2 MG/ML
4 INJECTION INTRAMUSCULAR; INTRAVENOUS EVERY 6 HOURS PRN
Status: DISCONTINUED | OUTPATIENT
Start: 2021-01-14 | End: 2021-01-15

## 2021-01-14 RX ADMIN — ZOLPIDEM TARTRATE 5 MG: 5 TABLET ORAL at 21:37

## 2021-01-14 RX ADMIN — LEVOTHYROXINE SODIUM 25 MCG: 25 TABLET ORAL at 21:19

## 2021-01-14 RX ADMIN — CALCIUM CARBONATE (ANTACID) CHEW TAB 500 MG 1000 MG: 500 CHEW TAB at 22:26

## 2021-01-14 RX ADMIN — DINOPROSTONE 10 MG: 10 INSERT VAGINAL at 21:19

## 2021-01-15 ENCOUNTER — ANESTHESIA EVENT (OUTPATIENT)
Dept: OBGYN | Facility: CLINIC | Age: 31
End: 2021-01-15
Payer: COMMERCIAL

## 2021-01-15 ENCOUNTER — ANESTHESIA (OUTPATIENT)
Dept: OBGYN | Facility: CLINIC | Age: 31
End: 2021-01-15
Payer: COMMERCIAL

## 2021-01-15 LAB
GLUCOSE BLDC GLUCOMTR-MCNC: 117 MG/DL (ref 70–99)
GLUCOSE BLDC GLUCOMTR-MCNC: 126 MG/DL (ref 70–99)
GLUCOSE BLDC GLUCOMTR-MCNC: 73 MG/DL (ref 70–99)
GLUCOSE BLDC GLUCOMTR-MCNC: 74 MG/DL (ref 70–99)
GLUCOSE BLDC GLUCOMTR-MCNC: 82 MG/DL (ref 70–99)
GLUCOSE BLDC GLUCOMTR-MCNC: 83 MG/DL (ref 70–99)
GLUCOSE BLDC GLUCOMTR-MCNC: 96 MG/DL (ref 70–99)
KETONES BLD-SCNC: 0.2 MMOL/L (ref 0–0.6)
T PALLIDUM AB SER QL: NONREACTIVE

## 2021-01-15 PROCEDURE — 999N001017 HC STATISTIC GLUCOSE BY METER IP

## 2021-01-15 PROCEDURE — 250N000011 HC RX IP 250 OP 636: Performed by: ANESTHESIOLOGY

## 2021-01-15 PROCEDURE — 258N000003 HC RX IP 258 OP 636: Performed by: OBSTETRICS & GYNECOLOGY

## 2021-01-15 PROCEDURE — 250N000013 HC RX MED GY IP 250 OP 250 PS 637: Performed by: OBSTETRICS & GYNECOLOGY

## 2021-01-15 PROCEDURE — 36415 COLL VENOUS BLD VENIPUNCTURE: CPT | Performed by: OBSTETRICS & GYNECOLOGY

## 2021-01-15 PROCEDURE — 120N000012 HC R&B POSTPARTUM

## 2021-01-15 PROCEDURE — 722N000001 HC LABOR CARE VAGINAL DELIVERY SINGLE

## 2021-01-15 PROCEDURE — 250N000013 HC RX MED GY IP 250 OP 250 PS 637: Performed by: SPECIALIST

## 2021-01-15 PROCEDURE — 3E0P7VZ INTRODUCTION OF HORMONE INTO FEMALE REPRODUCTIVE, VIA NATURAL OR ARTIFICIAL OPENING: ICD-10-PCS | Performed by: SPECIALIST

## 2021-01-15 PROCEDURE — 00HU33Z INSERTION OF INFUSION DEVICE INTO SPINAL CANAL, PERCUTANEOUS APPROACH: ICD-10-PCS | Performed by: ANESTHESIOLOGY

## 2021-01-15 PROCEDURE — 3E0R3BZ INTRODUCTION OF ANESTHETIC AGENT INTO SPINAL CANAL, PERCUTANEOUS APPROACH: ICD-10-PCS | Performed by: ANESTHESIOLOGY

## 2021-01-15 PROCEDURE — 250N000009 HC RX 250: Performed by: SPECIALIST

## 2021-01-15 PROCEDURE — 82010 KETONE BODYS QUAN: CPT | Performed by: OBSTETRICS & GYNECOLOGY

## 2021-01-15 RX ORDER — HYDROCORTISONE 2.5 %
CREAM (GRAM) TOPICAL 3 TIMES DAILY PRN
Status: DISCONTINUED | OUTPATIENT
Start: 2021-01-15 | End: 2021-01-17 | Stop reason: HOSPADM

## 2021-01-15 RX ORDER — NICOTINE POLACRILEX 4 MG
15-30 LOZENGE BUCCAL
Status: DISCONTINUED | OUTPATIENT
Start: 2021-01-15 | End: 2021-01-17 | Stop reason: HOSPADM

## 2021-01-15 RX ORDER — LIDOCAINE HYDROCHLORIDE AND EPINEPHRINE 15; 5 MG/ML; UG/ML
3 INJECTION, SOLUTION EPIDURAL
Status: DISCONTINUED | OUTPATIENT
Start: 2021-01-15 | End: 2021-01-15

## 2021-01-15 RX ORDER — BUPROPION HYDROCHLORIDE 150 MG/1
150 TABLET ORAL EVERY MORNING
Status: DISCONTINUED | OUTPATIENT
Start: 2021-01-16 | End: 2021-01-17 | Stop reason: HOSPADM

## 2021-01-15 RX ORDER — ONDANSETRON 2 MG/ML
4 INJECTION INTRAMUSCULAR; INTRAVENOUS EVERY 6 HOURS PRN
Status: DISCONTINUED | OUTPATIENT
Start: 2021-01-15 | End: 2021-01-15

## 2021-01-15 RX ORDER — IBUPROFEN 400 MG/1
800 TABLET, FILM COATED ORAL EVERY 6 HOURS PRN
Status: DISCONTINUED | OUTPATIENT
Start: 2021-01-15 | End: 2021-01-17 | Stop reason: HOSPADM

## 2021-01-15 RX ORDER — DOCUSATE SODIUM 100 MG/1
100 CAPSULE, LIQUID FILLED ORAL 2 TIMES DAILY
Status: DISCONTINUED | OUTPATIENT
Start: 2021-01-15 | End: 2021-01-17 | Stop reason: HOSPADM

## 2021-01-15 RX ORDER — ROPIVACAINE HYDROCHLORIDE 2 MG/ML
10 INJECTION, SOLUTION EPIDURAL; INFILTRATION; PERINEURAL ONCE
Status: COMPLETED | OUTPATIENT
Start: 2021-01-15 | End: 2021-01-15

## 2021-01-15 RX ORDER — BISACODYL 10 MG
10 SUPPOSITORY, RECTAL RECTAL DAILY PRN
Status: DISCONTINUED | OUTPATIENT
Start: 2021-01-17 | End: 2021-01-17 | Stop reason: HOSPADM

## 2021-01-15 RX ORDER — OXYTOCIN/0.9 % SODIUM CHLORIDE 30/500 ML
340 PLASTIC BAG, INJECTION (ML) INTRAVENOUS CONTINUOUS PRN
Status: DISCONTINUED | OUTPATIENT
Start: 2021-01-15 | End: 2021-01-17 | Stop reason: HOSPADM

## 2021-01-15 RX ORDER — ACETAMINOPHEN 325 MG/1
650 TABLET ORAL EVERY 4 HOURS PRN
Status: DISCONTINUED | OUTPATIENT
Start: 2021-01-15 | End: 2021-01-17 | Stop reason: HOSPADM

## 2021-01-15 RX ORDER — EPHEDRINE SULFATE 50 MG/ML
5 INJECTION, SOLUTION INTRAMUSCULAR; INTRAVENOUS; SUBCUTANEOUS
Status: DISCONTINUED | OUTPATIENT
Start: 2021-01-15 | End: 2021-01-15

## 2021-01-15 RX ORDER — METHYLERGONOVINE MALEATE 0.2 MG/ML
200 INJECTION INTRAVENOUS
Status: DISCONTINUED | OUTPATIENT
Start: 2021-01-15 | End: 2021-01-17 | Stop reason: HOSPADM

## 2021-01-15 RX ORDER — OXYTOCIN/0.9 % SODIUM CHLORIDE 30/500 ML
1-24 PLASTIC BAG, INJECTION (ML) INTRAVENOUS CONTINUOUS
Status: DISCONTINUED | OUTPATIENT
Start: 2021-01-15 | End: 2021-01-15

## 2021-01-15 RX ORDER — DEXTROSE MONOHYDRATE 25 G/50ML
25-50 INJECTION, SOLUTION INTRAVENOUS
Status: DISCONTINUED | OUTPATIENT
Start: 2021-01-15 | End: 2021-01-17 | Stop reason: HOSPADM

## 2021-01-15 RX ORDER — OXYTOCIN/0.9 % SODIUM CHLORIDE 30/500 ML
100 PLASTIC BAG, INJECTION (ML) INTRAVENOUS CONTINUOUS
Status: DISCONTINUED | OUTPATIENT
Start: 2021-01-15 | End: 2021-01-17 | Stop reason: HOSPADM

## 2021-01-15 RX ORDER — ONDANSETRON 4 MG/1
4 TABLET, ORALLY DISINTEGRATING ORAL EVERY 6 HOURS PRN
Status: DISCONTINUED | OUTPATIENT
Start: 2021-01-15 | End: 2021-01-15

## 2021-01-15 RX ORDER — OXYTOCIN 10 [USP'U]/ML
10 INJECTION, SOLUTION INTRAMUSCULAR; INTRAVENOUS
Status: DISCONTINUED | OUTPATIENT
Start: 2021-01-15 | End: 2021-01-17 | Stop reason: HOSPADM

## 2021-01-15 RX ORDER — NALBUPHINE HYDROCHLORIDE 10 MG/ML
2.5-5 INJECTION, SOLUTION INTRAMUSCULAR; INTRAVENOUS; SUBCUTANEOUS EVERY 6 HOURS PRN
Status: DISCONTINUED | OUTPATIENT
Start: 2021-01-15 | End: 2021-01-15

## 2021-01-15 RX ORDER — MODIFIED LANOLIN
OINTMENT (GRAM) TOPICAL
Status: DISCONTINUED | OUTPATIENT
Start: 2021-01-15 | End: 2021-01-17 | Stop reason: HOSPADM

## 2021-01-15 RX ORDER — MISOPROSTOL 200 UG/1
800 TABLET ORAL
Status: DISCONTINUED | OUTPATIENT
Start: 2021-01-15 | End: 2021-01-17 | Stop reason: HOSPADM

## 2021-01-15 RX ORDER — TRANEXAMIC ACID 10 MG/ML
1 INJECTION, SOLUTION INTRAVENOUS EVERY 30 MIN PRN
Status: DISCONTINUED | OUTPATIENT
Start: 2021-01-15 | End: 2021-01-17 | Stop reason: HOSPADM

## 2021-01-15 RX ADMIN — SODIUM CHLORIDE, POTASSIUM CHLORIDE, SODIUM LACTATE AND CALCIUM CHLORIDE 1000 ML: 600; 310; 30; 20 INJECTION, SOLUTION INTRAVENOUS at 13:03

## 2021-01-15 RX ADMIN — IBUPROFEN 800 MG: 400 TABLET ORAL at 22:00

## 2021-01-15 RX ADMIN — SODIUM CHLORIDE, POTASSIUM CHLORIDE, SODIUM LACTATE AND CALCIUM CHLORIDE: 600; 310; 30; 20 INJECTION, SOLUTION INTRAVENOUS at 09:07

## 2021-01-15 RX ADMIN — SODIUM CHLORIDE, POTASSIUM CHLORIDE, SODIUM LACTATE AND CALCIUM CHLORIDE: 600; 310; 30; 20 INJECTION, SOLUTION INTRAVENOUS at 13:41

## 2021-01-15 RX ADMIN — ACETAMINOPHEN 650 MG: 325 TABLET, FILM COATED ORAL at 22:00

## 2021-01-15 RX ADMIN — DOCUSATE SODIUM 100 MG: 100 CAPSULE, LIQUID FILLED ORAL at 22:00

## 2021-01-15 RX ADMIN — CALCIUM CARBONATE (ANTACID) CHEW TAB 500 MG 1000 MG: 500 CHEW TAB at 15:45

## 2021-01-15 RX ADMIN — ACETAMINOPHEN 650 MG: 325 TABLET, FILM COATED ORAL at 06:01

## 2021-01-15 RX ADMIN — Medication 12 ML/HR: at 14:11

## 2021-01-15 RX ADMIN — Medication 2 MILLI-UNITS/MIN: at 10:01

## 2021-01-15 RX ADMIN — ONDANSETRON 4 MG: 2 INJECTION INTRAMUSCULAR; INTRAVENOUS at 17:11

## 2021-01-15 RX ADMIN — ROPIVACAINE HYDROCHLORIDE 10 ML: 2 INJECTION, SOLUTION EPIDURAL; INFILTRATION at 14:07

## 2021-01-15 RX ADMIN — SODIUM CHLORIDE, POTASSIUM CHLORIDE, SODIUM LACTATE AND CALCIUM CHLORIDE: 600; 310; 30; 20 INJECTION, SOLUTION INTRAVENOUS at 09:59

## 2021-01-15 RX ADMIN — Medication 340 ML/HR: at 18:34

## 2021-01-15 ASSESSMENT — LIFESTYLE VARIABLES: TOBACCO_USE: 0

## 2021-01-15 ASSESSMENT — MIFFLIN-ST. JEOR: SCORE: 1530.55

## 2021-01-15 NOTE — ANESTHESIA PROCEDURE NOTES
Procedure note : epidural catheter      Staff -   Anesthesiologist:  Timothy Shaikh MD  Performed By: anesthesiologist  Referred By: OB  Pre-Procedure  Performed by Timothy Shaikh MD  Referred by OB  Location: OB      Pre-Anesthestic Checklist: patient identified, IV checked, risks and benefits discussed, informed consent, monitors and equipment checked, pre-op evaluation and at physician/surgeon's request    Timeout  Correct Patient: Yes   Correct Procedure: Yes   Correct Site: Yes   Correct Laterality: N/A   Correct Position: Yes   Site Marked: N/A   .   Procedure Documentation    .    Procedure: epidural catheter, .   Patient Position:sitting Insertion Site:L2-3  (midline approach) Injection technique: LORT saline   Local skin infiltrated with mL of 1% lidocaine.  PATRICK at 4 cm    Patient Prep/Sterile Barriers; mask, sterile gloves, povidone-iodine 7.5% surgical scrub, patient draped.  .  Needle: Touhy needle   Needle Gauge: 17.    Needle Length (Inches) 3.5   # of attempts: 1 and # of redirects:  .    . .  Catheter threaded easily  3 cm epidural space.  .   .    Assessment/Narrative  Paresthesias: No.  .  .  Aspiration negative for heme or CSF  . Test dose of 3 mL lidocaine 1.5% w/ 1:200,000 epinephrine at. Test dose negative for signs of intravascular, subdural or intrathecal injection. Comments:  No complications. Sterile Dressing. Orders to manage the epidural infusion have been entered and, through coordination with the nurse, we will continue to manage and monitor the patient's labor epidural.  We will continuously be available to adjust as needed throughout the entire labor and delivery process.

## 2021-01-15 NOTE — H&P
"  January 15, 2021    Stefany Ricardo  [id number]            OB Admit History & Physical      Ms. Rciardo  is here since last night for induction for IDGDM.    She has noticed normal FM    Patient's last menstrual period was 04/15/2020.   Her Estimated Date of Delivery: 2021  , making her 39w1d  wks.      Estimated body mass index is 31.24 kg/m  as calculated from the following:    Height as of this encounter: 1.626 m (5' 4\").    Weight as of this encounter: 82.6 kg (182 lb).  Her prenatal course has been  complicated by IDGDM.    See prenatal for labs.  neg GBBS, Rubella  Immune, RH +    Estimated fetal weight= 8#       She is a 30 year old   Her OB history:   OB History    Para Term  AB Living   3 1 1 0 1 1   SAB TAB Ectopic Multiple Live Births   1 0 0 0 1      # Outcome Date GA Lbr Moreno/2nd Weight Sex Delivery Anes PTL Lv   3 Current            2 SAB 2020           1 Term 18 40w3d / 02:24 3.57 kg (7 lb 13.9 oz) M Vag-Spont  N HAILEE      Name: JENNIFER,BABY1 STEFANY      Apgar1: 5  Apgar5: 8            Past Medical History:   Diagnosis Date     Complication of anesthesia     gets very nauseous     Depressive disorder      Diabetes (H)     gestational insulin control     Hx of previous reproductive problem     provera, clomid, PCOS     Hypertension 2018    Postpartum Pre-eclampsia     Mental disorder     anxiety, hx of medication (welbutrin)          Past Surgical History:   Procedure Laterality Date     FOOT SURGERY Right      HC TOOTH EXTRACTION W/FORCEP           No current outpatient medications on file.       Allergies: Amoxicillin      REVIEW OF SYSTEMS:  NEUROLOGIC:  Negative  EYES:  Negative  ENT:  Negative  GI:  Negative  BREAST:  Negative  :  Negative  GYN:  Negative  CV:  Negative  PULMONARY:  Negative  MUSCULOSKELETAL:  Negative  PSYCH:  Negative        Social History     Socioeconomic History     Marital status:      Spouse name: Not on file     Number of children: " Not on file     Years of education: Not on file     Highest education level: Not on file   Occupational History     Not on file   Social Needs     Financial resource strain: Not on file     Food insecurity     Worry: Not on file     Inability: Not on file     Transportation needs     Medical: Not on file     Non-medical: Not on file   Tobacco Use     Smoking status: Never Smoker     Smokeless tobacco: Never Used   Substance and Sexual Activity     Alcohol use: Not Currently     Drug use: No     Sexual activity: Yes     Partners: Male   Lifestyle     Physical activity     Days per week: Not on file     Minutes per session: Not on file     Stress: Not on file   Relationships     Social connections     Talks on phone: Not on file     Gets together: Not on file     Attends [employer] service: Not on file     Active member of club or organization: Not on file     Attends meetings of clubs or organizations: Not on file     Relationship status: Not on file     Intimate partner violence     Fear of current or ex partner: Not on file     Emotionally abused: Not on file     Physically abused: Not on file     Forced sexual activity: Not on file   Other Topics Concern     Not on file   Social History Narrative     Not on file      No family history on file.          Vitals:   FHT reactive  Irregular contractions     Alert Awake in NAD  HEENT grossly normal  Neck: no lymphadenopathy or thryoidomegaly  Lungs clear  Back no spinal or CVAT  Heart RRR  ABD gravid, vertex on exam   Pelvic:  no fluid noted, old blood noted  Cervix is 2 cm / 70, med, post/-3  EXT:  trace edema, no calf tenderness  Neuro:  Intact  Cervidil removed    Assessment:  IUP at 39w1d    IDGDM  H/O PPD    Plan:  Pitocin  Augmentation  Epidural when desired  Wellbutrin postpartum    [unfilled]      CODI CAVAZOS MD MD  Dept of OB/GYN  January 15, 2021

## 2021-01-15 NOTE — PLAN OF CARE
Pt rested comfortably overnight with cervidil in place for cervical ripening. Pt currently up on birthing ball. States she feels cramping and is tolerating well. Maternal VSS stable.   Cat I FHR tracing. Accels present, decels absent. Genny in place for continuous monitoring.

## 2021-01-15 NOTE — PLAN OF CARE
Cervidil removed by Dr. Santos at 0858.  Patient agrees to start Pitocin augmentation (will do so in one hour).  She prefers to keep changing positions, from birthing ball to ambulating in room and hallway.  She desires to use bath tub in a bit.  Genny monitor in place.  Pt notes contractions are more frequent, feeling them in her low abdomen and radiating occasionally to her low back. She desires epidural eventually.

## 2021-01-15 NOTE — PROGRESS NOTES
HOSPITALIST CHART CHECK:    30 year old female with history of GDM who is here for cervical ripening and induction.    Review of [facility] Record show that patient was receiving 4 units of insulin at bedtime since 2020.      Discussed with floor nurse.  Hopefully, patient will deliver today.  Fasting glucose this morning of 83.      PLAN:  Antepartum GDM:  --Monitor 2 hour post-prandial glucose levels.    --Hold PTA insulin at this time.   --Suspect patient has decreased appetite.    --Hypoglycemic protocol in place.      Intrapartum GDM:  --Management per protocol and OB/GYN.      Post-partum GDM:  --Check blood glucose: Within 2 hours post-delivery, then before meals and at HS.   --Discontinue blood glucose monitoring if BG is within 70-99 mg/dL for 24 hours.    --If BG is not with 70-99 mg/dL notify provider for additional follow-up.  --With history of GDM do not anticipate she will have any insulin needs post delivery and can follow up with PCP post discharge for follow up testing in 6-12 weeks.      Will continue to chart check.      Santos Harrison PA-C

## 2021-01-15 NOTE — PLAN OF CARE
Data: Patient admitted to room 214 at 1930. Patient is a . Prenatal record reviewed.   OB History    Para Term  AB Living   3 1 1 0 1 1   SAB TAB Ectopic Multiple Live Births   1 0 0 0 1      # Outcome Date GA Lbr Moreno/2nd Weight Sex Delivery Anes PTL Lv   3 Current            2 SAB 2020           1 Term 18 40w3d / 02:24 3.57 kg (7 lb 13.9 oz) M Vag-Spont  N HAILEE      Name: SHANAE RODRIGUEZ PRAMOD      Apgar1: 5  Apgar5: 8   .  Medical History:   Past Medical History:   Diagnosis Date     Complication of anesthesia     gets very nauseous     Depressive disorder      Diabetes (H)     gestational insulin control     Hx of previous reproductive problem     provera, clomid, PCOS     Mental disorder     anxiety, hx of medication (welbutrin)   .  Gestational age 39w1d. Vital signs per doc flowsheet. Fetal movement present. Patient reports Induction Of Labor   as reason for admission. Support persons Will,  present.  Action: Care of patient assumed at 1930. Verbal consent for EFM, external fetal monitors applied. Admission assessment completed. Patient and support persons educated on labor process. Patient instructed to report change in fetal movement, contractions, vaginal leaking of fluid or bleeding, abdominal pain, or any concerns related to the pregnancy to her nurse/physician. Patient oriented to room, call light in reach.   Response: Dr. Aguirre informed of POC. Plan per provider is Cervidil overnight for cervical ripening. Patient verbalized understanding of education and verbalized agreement with plan. Patient coping with labor via sleep and rest.    Hospitalist will consult with pt in the morning for gestational diabetes.

## 2021-01-15 NOTE — PROGRESS NOTES
"Comfortable with epidural  /72   Temp 97.5  F (36.4  C) (Temporal)   Resp 16   Ht 1.626 m (5' 4\")   Wt 82.6 kg (182 lb)   LMP 04/15/2020   SpO2 94%   BMI 31.24 kg/m    Ctxs every 2\"   with moderate variability, accels without decels  Cx 4-5/80,mid/-2  Probable SROM but bag AROM'd for possible forebag  "

## 2021-01-15 NOTE — CONSULTS
Hospitalst Consultation: GDM    30 year old female with history of GDM who is here for cervical ripening and induction. Per bedside RN, reportedly started on insulin for GDM, unclear current dosing.  Hospitalist consulted for postpartum glucose management of GDM.    - Management of glucose until delivery by OBGYN  - Please update PTA med list and scan in endocrine/OB notes, as currently no notes in Epic indicating insulin regimen    Per GDM protocol:  - Check blood glucose: Within 2 hours post-delivery, then before meals and at HS.   - Discontinue blood glucose monitoring if BG is within 70-99 mg/dL for 24 hours.    - If BG is not with 70-99 mg/dL notify provider for additional follow-up.  - With history of GDM do not anticipate she will have any insulin needs post delivery and can follow up with PCP post discharge for follow up testing as needed.     No results for input(s): GLC, BGM in the last 168 hours.    Hospitalist to chart check 1/14/21, notify our service after delivery.    Cherry Mcdaniel), PA-C  Hospitalist [facility]

## 2021-01-15 NOTE — ANESTHESIA PREPROCEDURE EVALUATION
Anesthesia Pre-Procedure Evaluation    Patient: Stefany Ricardo   MRN: [id number] : 1990          Preoperative Diagnosis: * No surgery found *        Past Medical History:   Diagnosis Date     Complication of anesthesia     gets very nauseous     Depressive disorder      Diabetes (H)     gestational insulin control     Hx of previous reproductive problem     provera, clomid, PCOS     Hypertension 2018    Postpartum Pre-eclampsia     Mental disorder     anxiety, hx of medication (welbutrin)     Past Surgical History:   Procedure Laterality Date     FOOT SURGERY Right 2005     HC TOOTH EXTRACTION W/FORCEP         Anesthesia Evaluation     . Pt has had prior anesthetic.     History of anesthetic complications   - PONV        ROS/MED HX    ENT/Pulmonary:      (-) tobacco use   Neurologic:  - neg neurologic ROS     Cardiovascular:     (+) hypertension----. : . . . :. .       METS/Exercise Tolerance:     Hematologic:  - neg hematologic  ROS       Musculoskeletal:  - neg musculoskeletal ROS       GI/Hepatic:         Renal/Genitourinary:         Endo:     (+) type II DM .      Psychiatric:     (+) psychiatric history depression      Infectious Disease:         Malignancy:         Other:                          Physical Exam  Normal systems: cardiovascular, pulmonary and dental    Airway   Mallampati: II  TM distance: >3 FB  Neck ROM: full    Dental     Cardiovascular       Pulmonary             Lab Results   Component Value Date    WBC 5.1 2020    HGB 13.7 2021    HCT 40.1 2020     2021     2020    POTASSIUM 3.9 2020    CHLORIDE 105 2020    CO2 26 2020    BUN 7 2020    CR 0.72 2020    GLC 85 2020    ROHAN 9.2 2020    MAG 1.9 2020    ALBUMIN 3.9 2020    PROTTOTAL 7.3 2020    ALT 19 2020    AST 9 2020    ALKPHOS 59 2020    BILITOTAL 0.4 2020    TSH 2.98 2020       Preop Vitals  BP Readings  "from Last 3 Encounters:   01/15/21 132/85   12/30/20 128/67   10/19/20 113/64    Pulse Readings from Last 3 Encounters:   05/20/20 80   02/17/20 84   07/14/18 76      Resp Readings from Last 3 Encounters:   01/15/21 16   12/30/20 16   05/20/20 18    SpO2 Readings from Last 3 Encounters:   05/20/20 100%   02/17/20 97%   07/08/18 96%      Temp Readings from Last 1 Encounters:   01/15/21 36.5  C (97.7  F) (Temporal)    Ht Readings from Last 1 Encounters:   01/15/21 1.626 m (5' 4\")      Wt Readings from Last 1 Encounters:   01/15/21 82.6 kg (182 lb)    Estimated body mass index is 31.24 kg/m  as calculated from the following:    Height as of this encounter: 1.626 m (5' 4\").    Weight as of this encounter: 82.6 kg (182 lb).       Anesthesia Plan      History & Physical Review      ASA Status:  2 .             Postoperative Care      Consents                 Timothy Shaikh MD  "

## 2021-01-16 PROCEDURE — 250N000013 HC RX MED GY IP 250 OP 250 PS 637: Performed by: SPECIALIST

## 2021-01-16 PROCEDURE — 120N000012 HC R&B POSTPARTUM

## 2021-01-16 RX ADMIN — ACETAMINOPHEN 650 MG: 325 TABLET, FILM COATED ORAL at 17:52

## 2021-01-16 RX ADMIN — ACETAMINOPHEN 650 MG: 325 TABLET, FILM COATED ORAL at 04:33

## 2021-01-16 RX ADMIN — DOCUSATE SODIUM 100 MG: 100 CAPSULE, LIQUID FILLED ORAL at 21:14

## 2021-01-16 RX ADMIN — DOCUSATE SODIUM 100 MG: 100 CAPSULE, LIQUID FILLED ORAL at 09:03

## 2021-01-16 RX ADMIN — IBUPROFEN 800 MG: 400 TABLET ORAL at 11:55

## 2021-01-16 RX ADMIN — BUPROPION HYDROCHLORIDE 150 MG: 150 TABLET, EXTENDED RELEASE ORAL at 09:03

## 2021-01-16 RX ADMIN — IBUPROFEN 800 MG: 400 TABLET ORAL at 17:52

## 2021-01-16 RX ADMIN — ACETAMINOPHEN 650 MG: 325 TABLET, FILM COATED ORAL at 09:06

## 2021-01-16 RX ADMIN — HYDROCORTISONE: 25 CREAM TOPICAL at 04:33

## 2021-01-16 RX ADMIN — IBUPROFEN 800 MG: 400 TABLET ORAL at 04:33

## 2021-01-16 NOTE — PLAN OF CARE
Vital signs stable. Postpartum assessment WDL. Pain controlled with ibuprofen and Tylenol. Patient voiding without difficulty. During shift patient said she was finding it a little harder to breath or take a deep breathe.  Lungs clear and oxygen sats 100%.  Patient said it could just be muscle strain from pushing.  Breastfeeding on cue independently. Patient and infant bonding well. Will continue with current plan of care.

## 2021-01-16 NOTE — DISCHARGE SUMMARY
[facility] Discharge Summary    Stefany Ricardo MRN# [id number]   Age: 30 year old YOB: 1990     Date of Admission:  2021  Date of Discharge::  2020  Admitting Physician:  Bhargavi Castillo MD  Discharge Physician:  Yanet Can MD     Home clinic: [facility]          Admission Diagnoses:   Induction of labor          Discharge Diagnosis:   Normal spontaneous vaginal delivery  Intrauterine pregnancy at 39 weeks gestation          Procedures:   Procedure(s):        No other procedures performed during this admission           Medications Prior to Admission:     Medications Prior to Admission   Medication Sig Dispense Refill Last Dose     buPROPion (WELLBUTRIN XL) 150 MG 24 hr tablet Take 150 mg by mouth every morning Start post delivery        levothyroxine (SYNTHROID/LEVOTHROID) 25 MCG tablet Take 25 mcg by mouth daily Stop post delivery   2021 at Unknown time     Prenatal Vit-Fe Fumarate-FA (PRENATAL VITAMIN PO) Take 1 tablet by mouth daily   2021 at Unknown time     [DISCONTINUED] BABY ASPIRIN PO Take 1 tablet by mouth daily   Past Month at Unknown time     [DISCONTINUED] insulin  UNIT/ML vial Inject 4 Units Subcutaneous At Bedtime   Past Week at Unknown time     [DISCONTINUED] metFORMIN (GLUCOPHAGE) 500 MG tablet Take 500 mg by mouth daily   More than a month at Unknown time     [DISCONTINUED] progesterone 100 MG VA SUPP Place 100 mg vaginally daily   More than a month at Unknown time             Discharge Medications:     Current Discharge Medication List      CONTINUE these medications which have NOT CHANGED    Details   buPROPion (WELLBUTRIN XL) 150 MG 24 hr tablet Take 150 mg by mouth every morning Start post delivery      levothyroxine (SYNTHROID/LEVOTHROID) 25 MCG tablet Take 25 mcg by mouth daily Stop post delivery      Prenatal Vit-Fe Fumarate-FA (PRENATAL VITAMIN PO) Take 1 tablet by mouth daily         STOP taking these  medications       BABY ASPIRIN PO Comments:   Reason for Stopping:         insulin  UNIT/ML vial Comments:   Reason for Stopping:         metFORMIN (GLUCOPHAGE) 500 MG tablet Comments:   Reason for Stopping:         progesterone 100 MG VA SUPP Comments:   Reason for Stopping:                     Consultations:   No consultations were requested during this admission          Brief History of Labor:   Admit for induction of labor, gestational diabetes           Hospital Course:   The patient's hospital course was unremarkable.  On discharge, her pain was well controlled. Vaginal bleeding is similar to peak menstrual flow.  Voiding without difficulty.  Ambulating well and tolerating a normal diet.  No fever.  Breastfeeding well.  Infant is stable.  No bowel movement yet.  She was discharged on post-partum day #2 due to low BSs on baby PPD#1    Post-partum hemoglobin:   Hemoglobin   Date Value Ref Range Status   01/14/2021 13.7 11.7 - 15.7 g/dL Final             Discharge Instructions and Follow-Up:   Discharge diet: Regular   Discharge activity: No sex for 6 week(s)   Discharge follow-up: Follow up with primary care provider in 6 weeks   Wound care: Ice to area for comfort           Discharge Disposition:   Discharged to home      Attestation:  I have reviewed today's vital signs, notes, medications, labs and imaging.    Yanet Can MD

## 2021-01-16 NOTE — LACTATION NOTE
"Initial visit with Stefnay, FOB, and baby boy. Stefany had GDM, infant is still having his blood sugar checked pre-feed and is being supplementing with EBM/DBM. Stefany is pumping.  Stefany shares she exclusively pumped and bottled with their first baby so would like help with positioning once infant becomes more wakeful. Encouraged Stefany to call for assistance/latch check.      Highlighted breast feeding section in our \"Guide to Postpartum and  Care.\" Discussed  breastfeeding basics: 1) watch for early feeding cues (licking lips, stirring or rooting, sucking movement with mouth, hands to mouth), 2) feed infant on demand, a minimum of 8 times in 24 hours, and 3) techniques to waking a sleepy baby to nurse (undress infant, change diaper if necessary, gently stroking bottom of feet and back, snuggling infant skin to skin, expressing colostrum).  Emphasized how important skin to skin is for enhancing early breastfeeding success!     Parents educated to \"typical\"  feeding patterns/behavior: from 1st day sleepiness through cluster-feeding on day (night) 2.      Appreciative of visit.    Shereen Barros RN, IBCLC            "

## 2021-01-16 NOTE — PROGRESS NOTES
Data: Stefany Ricardo transferred to rm 425 via wheelchair at 2130. Baby transferred via parent's arms.  Action: Receiving unit notified of transfer: Yes. Patient and family notified of room change. Report given to LICO Carmen at 2130. Belongings sent to receiving unit. Accompanied by Registered Nurse. Oriented patient to surroundings. Call light within reach. ID bands double-checked with receiving RN.  Response: Patient tolerated transfer and is stable.

## 2021-01-16 NOTE — PROGRESS NOTES
HOSPITALIST CHART CHECK:    30 year old female with history of GDM who is here for cervical ripening and induction.    [facility] of [facility] Record show that patient was receiving 4 units of insulin at bedtime since 2020.      Patient has delivered and last blood glucose was checked last night and at 117.  No further checks have occurred.  Appears patient will be discharging later today/tonight and all gestational diabetic agents have been discontinued.      Post-partum GDM:  --With history of GDM do not anticipate she will have any insulin needs post delivery and can follow up with PCP post discharge for follow up testing in 6-12 weeks.      Hospitalist service will sign off.      Santos Harrison PA-C

## 2021-01-16 NOTE — PROGRESS NOTES
Post-Partum Progress Note          Assessment and Plan:    Assessment:   Post-partum day #1  Normal spontaneous vaginal delivery     Doing well.      Plan:   Routine pp care  Discharge this evening             Significant Problems:    None          Review of Systems:    The patient denies any chest pain, shortness of breath, excessive pain, fever, chills, purulent drainage from the wound, nausea or vomiting.             Physical Exam:   All vitals stable  Uterine fundus is firm, non-tender and at the level of the umbilicus          Data:     Hemoglobin   Date Value Ref Range Status   01/14/2021 13.7 11.7 - 15.7 g/dL Final   ]      Yanet Can MD

## 2021-01-16 NOTE — ANESTHESIA POSTPROCEDURE EVALUATION
Patient: Stefany Ricardo    * No procedures listed *    Diagnosis:* No pre-op diagnosis entered *  Diagnosis Additional Information: No value filed.    Anesthesia Type:  No value filed.    Note:  Anesthesia Post Evaluation    Patient location during evaluation: floor  Patient participation: Able to fully participate in evaluation  Level of consciousness: awake and alert  Pain management: adequate  Airway patency: patent  Cardiovascular status: acceptable  Respiratory status: acceptable     Anesthetic complications: None    Comments: Epidural has worn off and no complications        Last vitals:  Vitals:    01/16/21 0200 01/16/21 0810 01/16/21 1744   BP: 119/76 115/75 120/76   Pulse: 66 65 67   Resp: 16 18 16   Temp: 36.7  C (98  F) 36.6  C (97.9  F) 36.9  C (98.4  F)   SpO2:            Electronically Signed By: Adebayo Toribio MD  January 16, 2021  5:58 PM

## 2021-01-16 NOTE — L&D DELIVERY NOTE
Patient presented  For induction for IDGDM. Pregnancy also complicated by h/o PPD  Received Cervidil  Last nights with cramping and cervical change  Pitocin begun this am. Progressed into labor and epidural placed. SROM clear fluid, forebag ruptured. Progressed rapidly to completely. Pushed well. Deep variable decelerations with pushing   male, Apgars 9 & 9, wt NA, from OA vertex with nuchal cord and terminal meconium. No laceration  QBL- 125 ml  Mother and  Baby doing well  Cord gasses sent

## 2021-01-16 NOTE — PLAN OF CARE
Vital signs stable. Postpartum assessment WDL. Pain controlled with tylenol and motrin ,hydrocortisone cream  given for hemorrhoids  Patient up ambulating voiding without difficulty. Blood sugar at   Working on breastfeeding and pumping . Patient and infant bonding well. Will continue with current plan of care.

## 2021-01-17 VITALS
DIASTOLIC BLOOD PRESSURE: 74 MMHG | TEMPERATURE: 98 F | OXYGEN SATURATION: 94 % | HEIGHT: 64 IN | BODY MASS INDEX: 31.07 KG/M2 | WEIGHT: 182 LBS | SYSTOLIC BLOOD PRESSURE: 117 MMHG | HEART RATE: 75 BPM | RESPIRATION RATE: 18 BRPM

## 2021-01-17 PROCEDURE — 250N000013 HC RX MED GY IP 250 OP 250 PS 637: Performed by: SPECIALIST

## 2021-01-17 RX ADMIN — ACETAMINOPHEN 650 MG: 325 TABLET, FILM COATED ORAL at 00:13

## 2021-01-17 RX ADMIN — IBUPROFEN 800 MG: 400 TABLET ORAL at 00:13

## 2021-01-17 RX ADMIN — ACETAMINOPHEN 650 MG: 325 TABLET, FILM COATED ORAL at 06:10

## 2021-01-17 RX ADMIN — IBUPROFEN 800 MG: 400 TABLET ORAL at 06:10

## 2021-01-17 RX ADMIN — BUPROPION HYDROCHLORIDE 150 MG: 150 TABLET, EXTENDED RELEASE ORAL at 08:44

## 2021-01-17 RX ADMIN — DOCUSATE SODIUM 100 MG: 100 CAPSULE, LIQUID FILLED ORAL at 08:44

## 2021-01-17 NOTE — PLAN OF CARE
Vital signs stable. Postpartum assessment WDL. Pain controlled with tylenol and motrin . Patient up ambulating voiding without difficulty.working on breast feeding and pumping . Patient and infant bonding well. Will continue with current plan of care.

## 2021-01-17 NOTE — PLAN OF CARE
Discharge instructions reviewed at length with patient and . Both feel comfortable in going home. They verbalize understanding in knowing when to call if problems arise.

## 2021-01-17 NOTE — PLAN OF CARE
Patient meeting expected goals for this shift.  Using ibuprofen & tylenol for pain/comfort.  Independent in all self and  cares.  Working on breastfeeding  and pumping.   present at bedside and supportive.  Positive bonding behaviors observed.  Continue to monitor and notify MD as needed.

## 2021-01-17 NOTE — LACTATION NOTE
"Discharge visit with Stefany, FOB, and baby boy. Stefany shares she feels confident with how breastfeeding is going and states her breasts feel much more full today. Stefany has been pumping to give EBM to infant to help stabilize blood glucose but since Stefany's milk is transitioning,  suggests Stefany stop pumping and instead feed infant more frequently (every 2 hours during the day). Infant circumcised today,  reviewed it would be normal for infant to be sleepy after his circ but encouraged Stefany to offer breast every three hours. If infant is too sleepy to feed, suggested Stefany pump.      We reviewed breastfeeding positions and techniques to obtaining/maintaining a deep latch (including nose to nipple alignment and supporting infant's shoulder blades vs head when bringing infant in to latch). Discussed BF should feel like a strong \"tug or pull\" when infant is suckling and if mother experiences a \"pinching or biting\" sensation, how to un-latch infant properly, assess nipple shape and make any necessary adjustments with positioning before re-latching. Discussed physiology of milk production from colostrum through milk coming in and how the breasts should begin to feel \"heavy or full\" between day 3-5. Encouraged reviewing the provided \"Guide to Postpartum and [place] Care\" handbook for topics including engorgement, plugged milk ducts, mastitis, safe sleep, and safety of baby. Discussed normal infant weight loss and when infant should be back to birth weight.     Answered questions regarding \"how to know when infant is done at the breast.\" Educated to infant satiety signs; encouraged listening for audible swallows along with watching for changes in infant's stool color. Stressed the importance of continuing to track infant's feeds and void/stools patterns. Discussed pumping (when it's helpful, when it's necessary, and when to begin pumping for milk storage),along with when to introduce a bottle. Stefany has a new breast pump for " home use.    Feeding plan recommendations: provide unlimited, on-demand breast feedings: At least 8-12 times/24 hours (reviewed early feeding cues). Encouraged on-going use of a feeding log or steve to record feedings along with void/stool patterns. Follow up with Pediatrician as requested and encouraged lactation follow up. Reviewed outpatient lactation resources. Appreciative of visit.    Shereen Barros RN, IBCLC

## 2021-04-25 ENCOUNTER — HEALTH MAINTENANCE LETTER (OUTPATIENT)
Age: 31
End: 2021-04-25

## 2021-10-10 ENCOUNTER — HEALTH MAINTENANCE LETTER (OUTPATIENT)
Age: 31
End: 2021-10-10

## 2021-12-06 ENCOUNTER — APPOINTMENT (OUTPATIENT)
Dept: CT IMAGING | Facility: CLINIC | Age: 31
End: 2021-12-06
Attending: PHYSICIAN ASSISTANT
Payer: COMMERCIAL

## 2021-12-06 ENCOUNTER — HOSPITAL ENCOUNTER (EMERGENCY)
Facility: CLINIC | Age: 31
Discharge: HOME | End: 2021-12-06
Attending: PHYSICIAN ASSISTANT | Admitting: PHYSICIAN ASSISTANT
Payer: COMMERCIAL

## 2021-12-06 VITALS
WEIGHT: 150 LBS | TEMPERATURE: 98.6 F | HEART RATE: 86 BPM | SYSTOLIC BLOOD PRESSURE: 100 MMHG | RESPIRATION RATE: 17 BRPM | HEIGHT: 64 IN | DIASTOLIC BLOOD PRESSURE: 69 MMHG | BODY MASS INDEX: 25.61 KG/M2 | OXYGEN SATURATION: 98 %

## 2021-12-06 DIAGNOSIS — R10.84 ABDOMINAL PAIN, GENERALIZED: ICD-10-CM

## 2021-12-06 DIAGNOSIS — R19.7 DIARRHEA, UNSPECIFIED TYPE: ICD-10-CM

## 2021-12-06 DIAGNOSIS — R11.2 NON-INTRACTABLE VOMITING WITH NAUSEA, UNSPECIFIED VOMITING TYPE: ICD-10-CM

## 2021-12-06 LAB
ALBUMIN SERPL-MCNC: 4 G/DL (ref 3.4–5)
ALBUMIN UR-MCNC: 30 MG/DL
ALP SERPL-CCNC: 77 U/L (ref 40–150)
ALT SERPL W P-5'-P-CCNC: 16 U/L (ref 0–50)
ANION GAP SERPL CALCULATED.3IONS-SCNC: 4 MMOL/L (ref 3–14)
APPEARANCE UR: CLEAR
AST SERPL W P-5'-P-CCNC: 14 U/L (ref 0–45)
BILIRUB SERPL-MCNC: 0.9 MG/DL (ref 0.2–1.3)
BILIRUB UR QL STRIP: NEGATIVE
BUN SERPL-MCNC: 10 MG/DL (ref 7–30)
CALCIUM SERPL-MCNC: 8.9 MG/DL (ref 8.5–10.1)
CHLORIDE BLD-SCNC: 108 MMOL/L (ref 94–109)
CO2 SERPL-SCNC: 24 MMOL/L (ref 20–32)
COLOR UR AUTO: YELLOW
CREAT SERPL-MCNC: 0.7 MG/DL (ref 0.52–1.04)
ERYTHROCYTE [DISTWIDTH] IN BLOOD BY AUTOMATED COUNT: 11.8 % (ref 10–15)
GFR SERPL CREATININE-BSD FRML MDRD: >90 ML/MIN/1.73M2
GLUCOSE BLD-MCNC: 96 MG/DL (ref 70–99)
GLUCOSE UR STRIP-MCNC: NEGATIVE MG/DL
HCG SERPL QL: NEGATIVE
HCT VFR BLD AUTO: 42.6 % (ref 35–47)
HGB BLD-MCNC: 14.8 G/DL (ref 11.7–15.7)
HGB UR QL STRIP: NEGATIVE
KETONES UR STRIP-MCNC: 40 MG/DL
LEUKOCYTE ESTERASE UR QL STRIP: NEGATIVE
LIPASE SERPL-CCNC: 92 U/L (ref 73–393)
MCH RBC QN AUTO: 30 PG (ref 26.5–33)
MCHC RBC AUTO-ENTMCNC: 34.7 G/DL (ref 31.5–36.5)
MCV RBC AUTO: 86 FL (ref 78–100)
MUCOUS THREADS #/AREA URNS LPF: PRESENT /LPF
NITRATE UR QL: NEGATIVE
PH UR STRIP: 6 [PH] (ref 5–7)
PLATELET # BLD AUTO: 157 10E3/UL (ref 150–450)
POTASSIUM BLD-SCNC: 3.8 MMOL/L (ref 3.4–5.3)
PROT SERPL-MCNC: 7.5 G/DL (ref 6.8–8.8)
RBC # BLD AUTO: 4.94 10E6/UL (ref 3.8–5.2)
RBC URINE: 3 /HPF
SODIUM SERPL-SCNC: 136 MMOL/L (ref 133–144)
SP GR UR STRIP: 1.03 (ref 1–1.03)
SQUAMOUS EPITHELIAL: 5 /HPF
UROBILINOGEN UR STRIP-MCNC: NORMAL MG/DL
WBC # BLD AUTO: 6.6 10E3/UL (ref 4–11)
WBC URINE: 3 /HPF

## 2021-12-06 PROCEDURE — 250N000011 HC RX IP 250 OP 636: Performed by: PHYSICIAN ASSISTANT

## 2021-12-06 PROCEDURE — 250N000009 HC RX 250: Performed by: PHYSICIAN ASSISTANT

## 2021-12-06 PROCEDURE — 85014 HEMATOCRIT: CPT | Performed by: PHYSICIAN ASSISTANT

## 2021-12-06 PROCEDURE — 81001 URINALYSIS AUTO W/SCOPE: CPT | Performed by: PHYSICIAN ASSISTANT

## 2021-12-06 PROCEDURE — 84703 CHORIONIC GONADOTROPIN ASSAY: CPT | Performed by: PHYSICIAN ASSISTANT

## 2021-12-06 PROCEDURE — 83690 ASSAY OF LIPASE: CPT | Performed by: PHYSICIAN ASSISTANT

## 2021-12-06 PROCEDURE — 96361 HYDRATE IV INFUSION ADD-ON: CPT

## 2021-12-06 PROCEDURE — 258N000003 HC RX IP 258 OP 636: Performed by: PHYSICIAN ASSISTANT

## 2021-12-06 PROCEDURE — 99285 EMERGENCY DEPT VISIT HI MDM: CPT | Mod: 25

## 2021-12-06 PROCEDURE — 96374 THER/PROPH/DIAG INJ IV PUSH: CPT | Mod: 59

## 2021-12-06 PROCEDURE — 96375 TX/PRO/DX INJ NEW DRUG ADDON: CPT

## 2021-12-06 PROCEDURE — 36415 COLL VENOUS BLD VENIPUNCTURE: CPT | Performed by: PHYSICIAN ASSISTANT

## 2021-12-06 PROCEDURE — 80053 COMPREHEN METABOLIC PANEL: CPT | Performed by: PHYSICIAN ASSISTANT

## 2021-12-06 PROCEDURE — 74177 CT ABD & PELVIS W/CONTRAST: CPT

## 2021-12-06 RX ORDER — ONDANSETRON 4 MG/1
4 TABLET, ORALLY DISINTEGRATING ORAL EVERY 8 HOURS PRN
Qty: 10 TABLET | Refills: 0 | Status: SHIPPED | OUTPATIENT
Start: 2021-12-06 | End: 2022-08-18

## 2021-12-06 RX ORDER — ONDANSETRON 4 MG/1
4 TABLET, ORALLY DISINTEGRATING ORAL ONCE
Status: COMPLETED | OUTPATIENT
Start: 2021-12-06 | End: 2021-12-06

## 2021-12-06 RX ORDER — ONDANSETRON 4 MG/1
4 TABLET, ORALLY DISINTEGRATING ORAL ONCE
Status: DISCONTINUED | OUTPATIENT
Start: 2021-12-06 | End: 2021-12-06

## 2021-12-06 RX ORDER — ONDANSETRON 2 MG/ML
4 INJECTION INTRAMUSCULAR; INTRAVENOUS ONCE
Status: COMPLETED | OUTPATIENT
Start: 2021-12-06 | End: 2021-12-06

## 2021-12-06 RX ORDER — SODIUM CHLORIDE 9 MG/ML
INJECTION, SOLUTION INTRAVENOUS CONTINUOUS
Status: DISCONTINUED | OUTPATIENT
Start: 2021-12-06 | End: 2021-12-06 | Stop reason: HOSPADM

## 2021-12-06 RX ORDER — IOPAMIDOL 755 MG/ML
75 INJECTION, SOLUTION INTRAVASCULAR ONCE
Status: COMPLETED | OUTPATIENT
Start: 2021-12-06 | End: 2021-12-06

## 2021-12-06 RX ORDER — KETOROLAC TROMETHAMINE 15 MG/ML
15 INJECTION, SOLUTION INTRAMUSCULAR; INTRAVENOUS ONCE
Status: COMPLETED | OUTPATIENT
Start: 2021-12-06 | End: 2021-12-06

## 2021-12-06 RX ADMIN — SODIUM CHLORIDE 1000 ML: 9 INJECTION, SOLUTION INTRAVENOUS at 17:41

## 2021-12-06 RX ADMIN — KETOROLAC TROMETHAMINE 15 MG: 15 INJECTION, SOLUTION INTRAMUSCULAR; INTRAVENOUS at 18:09

## 2021-12-06 RX ADMIN — IOPAMIDOL 75 ML: 755 INJECTION, SOLUTION INTRAVENOUS at 18:17

## 2021-12-06 RX ADMIN — ONDANSETRON 4 MG: 4 TABLET, ORALLY DISINTEGRATING ORAL at 19:36

## 2021-12-06 RX ADMIN — ONDANSETRON 4 MG: 2 INJECTION INTRAMUSCULAR; INTRAVENOUS at 17:41

## 2021-12-06 RX ADMIN — SODIUM CHLORIDE 62 ML: 900 INJECTION INTRAVENOUS at 18:19

## 2021-12-06 ASSESSMENT — ENCOUNTER SYMPTOMS
FEVER: 1
NAUSEA: 1
ABDOMINAL DISTENTION: 1
VOMITING: 1
ABDOMINAL PAIN: 1
COUGH: 0
BACK PAIN: 1
SHORTNESS OF BREATH: 0
DIARRHEA: 1

## 2021-12-06 NOTE — ED PROVIDER NOTES
"  History   Chief Complaint:  Abdominal Pain     The history is provided by the patient.      Stefany Ricardo is a 31 year old female with history of diabetes and hypertension who presents with abdominal pain. Yesterday, around 1500, she started feeling generally unwell and hours after onset of discomfort, she began vomiting with associated diarrhea. States that she had a high fever, up to 102, last night, but she has had no fevers today. Endorses central lower abdominal pain, abdominal distention and back pain. Nothing exacerbates or alleviates her abdominal pain/distention. She has never had these symptoms in the past. No known sick contacts and she did not have new foods yesterday. Of note, the patient is currently breast feeding and her baby is 10 months. No urinary symptoms, vaginal bleeding, chest pain, shortness of breath or cough. In addition, patient is not vaccinated to Covid. She took a Covid test today and her results are not back.     Review of Systems   Constitutional: Positive for fever (resolved).   Respiratory: Negative for cough and shortness of breath.    Cardiovascular: Negative for chest pain.   Gastrointestinal: Positive for abdominal distention, abdominal pain, diarrhea, nausea and vomiting.   Genitourinary: Negative.  Negative for vaginal bleeding.   Musculoskeletal: Positive for back pain.   All other systems reviewed and are negative.    Allergies:  Amoxicillin    Medications:  Wellbutrin XL    Past Medical History:     Diabetes  Hypertension  Depression    Social History:  Presents with her father    Patient has no PCP, but follows with an OB/GYN  Patient rarely drinks alcohol    Physical Exam     Patient Vitals for the past 24 hrs:   BP Temp Temp src Pulse Resp SpO2 Height Weight   21 1726 -- -- -- -- -- -- -- 68 kg (150 lb)   21 1723 112/78 98.7  F (37.1  C) Oral 103 17 100 % 1.626 m (5' 4\") --       Physical Exam  Vitals and nursing note reviewed.   Constitutional:      "  General: She is not in acute distress.     Appearance: She is well-developed. She is not toxic-appearing or diaphoretic.   HENT:      Head: Normocephalic.      Mouth/Throat:      Mouth: Mucous membranes are moist.   Eyes:      Extraocular Movements: Extraocular movements intact.   Cardiovascular:      Rate and Rhythm: Normal rate and regular rhythm.      Heart sounds: Normal heart sounds.   Pulmonary:      Effort: Pulmonary effort is normal. No respiratory distress.      Breath sounds: Normal breath sounds.   Abdominal:      General: Bowel sounds are normal.      Palpations: Abdomen is rigid. There is no fluid wave.      Tenderness: There is abdominal tenderness in the periumbilical area. There is no right CVA tenderness, left CVA tenderness, guarding or rebound.      Comments: Distended but soft.  No R/R/G.  TTP diffuse but mostly periumbilical.    Skin:     General: Skin is warm.      Capillary Refill: Capillary refill takes less than 2 seconds.   Neurological:      General: No focal deficit present.      Mental Status: She is alert.   Psychiatric:         Mood and Affect: Mood normal.         Behavior: Behavior normal.       Emergency Department Course   Imaging:  CT Abdomen Pelvis w Contrast   Preliminary Result   IMPRESSION:    1.  Small fluid distends small and large bowel loops and could relate to an enteritis.   2.  No other acute abnormality.   3.  Heterogeneous enhancing nodular lesions within the left liver are noted suggestive of hemangiomas.   4.  Trace pelvic fluid is likely physiologic.        Report per radiology    Laboratory:  Labs Ordered and Resulted from Time of ED Arrival to Time of ED Departure   ROUTINE UA WITH MICROSCOPIC REFLEX TO CULTURE - Abnormal       Result Value    Color Urine Yellow      Appearance Urine Clear      Glucose Urine Negative      Bilirubin Urine Negative      Ketones Urine 40  (*)     Specific Gravity Urine 1.027      Blood Urine Negative      pH Urine 6.0      Protein  Albumin Urine 30  (*)     Urobilinogen Urine Normal      Nitrite Urine Negative      Leukocyte Esterase Urine Negative      Mucus Urine Present (*)     RBC Urine 3 (*)     WBC Urine 3      Squamous Epithelials Urine 5 (*)    HCG QUALITATIVE PREGNANCY - Normal    hCG Serum Qualitative Negative     CBC WITH PLATELETS - Normal    WBC Count 6.6      RBC Count 4.94      Hemoglobin 14.8      Hematocrit 42.6      MCV 86      MCH 30.0      MCHC 34.7      RDW 11.8      Platelet Count 157     COMPREHENSIVE METABOLIC PANEL - Normal    Sodium 136      Potassium 3.8      Chloride 108      Carbon Dioxide (CO2) 24      Anion Gap 4      Urea Nitrogen 10      Creatinine 0.70      Calcium 8.9      Glucose 96      Alkaline Phosphatase 77      AST 14      ALT 16      Protein Total 7.5      Albumin 4.0      Bilirubin Total 0.9      GFR Estimate >90     LIPASE - Normal    Lipase 92          Procedures  None    Emergency Department Course:    Reviewed:  I reviewed nursing notes, vitals, past medical history and Care Everywhere    Assessments:  173    Attempted to see. Patient is in restroom.   174 I obtained history and examined the patient as noted above.   180    Labs and urine noted. Will proceed with CT with contrast.   I rechecked the patient and explained laboratory findings. Discussed need for CT.        Rechecked and updated. She is feeling improved and comfortable with discharge.     Interventions:  174 NS, 1 L, IV           Zofran, 4 mg, IV  180 Toradol, 15 mg, IV    Disposition:  The patient was discharged to home.     Impression & Plan   Medical Decision Makin y/o female presents with her father for evaluation of N/V/D that developed last night.  Non-bloody diarrhea.  Did have fever last night.  No worrisome risk factors in regards to diarrhea (bad food, ABX, foreign travel), reassuring.  ABD is distended but soft.  Possible this is AGE, viral or food borne.  Had outside COVID test today that was neg.  Will  await labs to assess liver or pancreas inflammation (as would then consider U/S) or if unremarkable will obtain CT imaging to further eval as consider appy (although with diarrhea would make less likely susp), etc.  Felt less  Likely SBO with moving stools and no prior ABD surgery.  Consider ectopic while we await UA as well.  Consider UTI.  Felt less likely ureteral colic.  Will medicate with IV fluids, toradol and zofran.  Pt and father at BS are comfortable with plan.     Update: Feeling improved s/p fluids/meds here.  Updated labs/CT imaging reassuring here and at this time S/S most susp for AGE, either viral or foodborne. Discussed she is felt stable for discharge.  Discussed ideal to F/U with established PCP before end of week (Today is Monday). Pt educated on S/S that should prompt ED re-eval (See D/C sheet).  Questions answered. Verbalized understanding. Comfortable with plan and appreciative.     Diagnosis:    ICD-10-CM    1. Non-intractable vomiting with nausea, unspecified vomiting type  R11.2    2. Diarrhea, unspecified type  R19.7    3. Abdominal pain, generalized  R10.84        Discharge Medications:  New Prescriptions    ONDANSETRON (ZOFRAN ODT) 4 MG ODT TAB    Take 1 tablet (4 mg) by mouth every 8 hours as needed for nausea       Scribe Disclosure:  Francy OKEEFE, am serving as a scribe at 5:22 PM on 12/6/2021 to document services personally performed by Ximena Iglesias PA-C based on my observations and the provider's statements to me.            Ximena Iglesias PA-C  12/06/21 1922

## 2021-12-06 NOTE — ED TRIAGE NOTES
Lower abdominal pain with n/v/d. Pain radiates to back, denies urinary s/sx. States her abdomen is bloated. Pt 19 months post partum- currently breastfeeding.

## 2021-12-07 NOTE — ED NOTES
Pt reports feeling nauseous and sweaty at time of discharge. Pt's vitals all WDL and pt given ODT zofran as RN had removed her PIV in preparation for discharge. Pt in agreement with plan for discharge, awaiting a few minutes to ensure she is feeling improved. Pt still comfortable with discharge.

## 2021-12-07 NOTE — DISCHARGE INSTRUCTIONS
Discharge Instructions  Gastroenteritis    You have been seen today for vomiting (throwing up) and diarrhea (loose stools), called gastroenteritis or the stomach flu. This is usually caused by a virus, but some bacteria, parasites, medicines or other medical conditions can cause similar symptoms. At this time your provider does not find that your vomiting and diarrhea is a sign of anything dangerous or life-threatening.  However, sometimes the signs of serious illness do not show up right away. Remember that serious problems like appendicitis can look like gastroenteritis at first.       Generally, every Emergency Department visit should have a follow-up clinic visit with either a primary or a specialty clinic/provider. Please follow-up as instructed by your emergency provider today.    Return to the Emergency Department if:  You keep vomiting and you are not able to keep liquids down.  You feel you are getting dehydrated, such as being very thirsty, not urinating (peeing), or feeling faint or lightheaded.   You develop a new fever.  You have abdominal (belly) pain that seems worse than cramps, is in one spot, or is getting worse over time.   You have blood in your vomit or in your diarrhea.  You feel very weak.    What can I do to help myself?  The most important thing to do is to drink clear liquids.  If you have been vomiting a lot, it is best to have only small, frequent sips of liquids.  Drinking too much at once may cause more vomiting. Water is a good first option for rehydration. If you are vomiting often, you must also replace electrolytes (salts and minerals) lost with your illness. Pedialyte  is the best rehydration liquid but many don t like the taste so sports drinks (like Gatorade ) are a good option. Sodas and juice are also options but are high in sugar. Avoid acid liquids (orange), caffeine (coffee) or alcohol. Do not drink milk until you no longer have diarrhea.  After liquids are staying down, you  may start eating mild foods. Soda crackers, toast, plain noodles, gelatin, applesauce and bananas are good first choices.  Avoid foods that have acid, are spicy, fatty or fibrous (such as meats, coarse grains, vegetables). You may start eating these foods again in about 3 days when you are better.  Sometimes treatment includes prescription medicine to prevent nausea (sick to your stomach) and vomiting and to prevent diarrhea. If your provider prescribes these for you, take them as directed.  Nonprescription medicine is available for the treatment of diarrhea and can be very effective.  If you use it, make sure you use the dose recommended on the package. Avoid Lomotil . Check with your healthcare provider before you use any medicine for diarrhea.  Do not take ibuprofen, or other nonsteroidal anti-inflammatory medicines without checking with your healthcare provider.  If you were given a prescription for medicine here today, be sure to read all of the information (including the package insert) that comes with your prescription.  This will include important information about the medicine, its side effects, and any warnings that you need to know about.  The pharmacist who fills the prescription can provide more information and answer questions you may have about the medicine.  If you have questions or concerns that the pharmacist cannot address, please call or return to the Emergency Department.   Remember that you can always come back to the Emergency Department if you are not able to see your regular provider in the amount of time listed above, if you get any new symptoms, or if there is anything that worries you.    Discharge Instructions  Abdominal Pain    Abdominal pain (belly pain) can be caused by many things. Your evaluation today does not show the exact cause for your pain. Your provider today has decided that it is unlikely your pain is due to a life threatening problem, or a problem requiring surgery or  hospital admission. Sometimes those problems cannot be found right away, so it is very important that you follow up as directed.  Sometimes only the changes which occur over time allow the cause of your pain to be found.    Generally, every Emergency Department visit should have a follow-up clinic visit with either a primary or a specialty clinic/provider. Please follow-up as instructed by your emergency provider today. With abdominal pain, we often recommend very close follow-up, such as the following day.    ADULTS:  Return to the Emergency Department right away if:    You get an oral temperature above 102oF or as directed by your provider.  You have blood in your stools. This may be bright red or appear as black, tarry stools.    You keep vomiting (throwing up) or cannot drink liquids.  You see blood when you vomit.   You cannot have a bowel movement or you cannot pass gas.  Your stomach gets bloated or bigger.  Your skin or the whites of your eyes look yellow.  You faint.  You have bloody, frequent or painful urination (peeing).  You have new symptoms or anything that worries you.    CHILDREN:  Return to the Emergency Department right away if your child has any of the above-listed symptoms or the following:    Pushes your hand away or screams/cries when his/her belly is touched.  You notice your child is very fussy or weak.  Your child is very tired and is too tired to eat or drink.  Your child is dehydrated.  Signs of dehydration can be:  Significant change in the amount of wet diapers/urine.  Your infant or child starts to have dry mouth and lips, or no saliva (spit) or tears.    PREGNANT WOMEN:  Return to the Emergency Department right away if you have any of the above-listed symptoms or the following:    You have bleeding, leaking fluid or passing tissue from the vagina.  You have worse pain or cramping, or pain in your shoulder or back.  You have vomiting that will not stop.  You have a temperature of 100oF  or more.  Your baby is not moving as much as usual.  You faint.  You get a bad headache with or without eye problems and abdominal pain.  You have a seizure.  You have unusual discharge from your vagina and abdominal pain.    Abdominal pain is pretty common during pregnancy.  Your pain may or may not be related to your pregnancy. You should follow-up closely with your OB provider so they can evaluate you and your baby.  Until you follow-up with your regular provider, do the following:     Avoid sex and do not put anything in your vagina.  Drink clear fluids.  Only take medications approved by your provider.    MORE INFORMATION:    Appendicitis:  A possible cause of abdominal pain in any person who still has their appendix is acute appendicitis. Appendicitis is often hard to diagnose.  Testing does not always rule out early appendicitis or other causes of abdominal pain. Close follow-up with your provider and re-evaluations may be needed to figure out the reason for your abdominal pain.    Follow-up:  It is very important that you make an appointment with your clinic and go to the appointment.  If you do not follow-up with your primary provider, it may result in missing an important development which could result in permanent injury or disability and/or lasting pain.  If there is any problem keeping your appointment, call your provider or return to the Emergency Department.    Medications:  Take your medications as directed by your provider today.  Before using over-the-counter medications, ask your provider and make sure to take the medications as directed.  If you have any questions about medications, ask your provider.    Diet:  Resume your normal diet as much as possible, but do not eat fried, fatty or spicy foods while you have pain.  Do not drink alcohol or have caffeine.  Do not smoke tobacco.    Probiotics: If you have been given an antibiotic, you may want to also take a probiotic pill or eat yogurt with  "live cultures. Probiotics have \"good bacteria\" to help your intestines stay healthy. Studies have shown that probiotics help prevent diarrhea (loose stools) and other intestine problems (including C. diff infection) when you take antibiotics. You can buy these without a prescription in the pharmacy section of the store.     If you were given a prescription for medicine here today, be sure to read all of the information (including the package insert) that comes with your prescription.  This will include important information about the medicine, its side effects, and any warnings that you need to know about.  The pharmacist who fills the prescription can provide more information and answer questions you may have about the medicine.  If you have questions or concerns that the pharmacist cannot address, please call or return to the Emergency Department.       Remember that you can always come back to the Emergency Department if you are not able to see your regular provider in the amount of time listed above, if you get any new symptoms, or if there is anything that worries you.    The examination and treatment you have received in the Emergency Department has been rendered on an emergency basis only and not intended to be a substitute for complete ongoing medical care.      "

## 2022-05-21 ENCOUNTER — HEALTH MAINTENANCE LETTER (OUTPATIENT)
Age: 32
End: 2022-05-21

## 2022-08-18 ENCOUNTER — APPOINTMENT (OUTPATIENT)
Dept: CT IMAGING | Facility: CLINIC | Age: 32
End: 2022-08-18
Attending: EMERGENCY MEDICINE
Payer: COMMERCIAL

## 2022-08-18 ENCOUNTER — HOSPITAL ENCOUNTER (EMERGENCY)
Facility: CLINIC | Age: 32
Discharge: HOME | End: 2022-08-18
Attending: EMERGENCY MEDICINE | Admitting: EMERGENCY MEDICINE
Payer: COMMERCIAL

## 2022-08-18 VITALS
OXYGEN SATURATION: 100 % | HEART RATE: 63 BPM | BODY MASS INDEX: 26.46 KG/M2 | DIASTOLIC BLOOD PRESSURE: 65 MMHG | RESPIRATION RATE: 18 BRPM | TEMPERATURE: 98.2 F | HEIGHT: 64 IN | WEIGHT: 155 LBS | SYSTOLIC BLOOD PRESSURE: 105 MMHG

## 2022-08-18 DIAGNOSIS — N30.01 ACUTE CYSTITIS WITH HEMATURIA: ICD-10-CM

## 2022-08-18 LAB
ALBUMIN UR-MCNC: 600 MG/DL
ANION GAP SERPL CALCULATED.3IONS-SCNC: 5 MMOL/L (ref 3–14)
APPEARANCE UR: CLEAR
ATRIAL RATE - MUSE: 64 BPM
BASOPHILS # BLD AUTO: 0 10E3/UL (ref 0–0.2)
BASOPHILS NFR BLD AUTO: 0 %
BILIRUB UR QL STRIP: NEGATIVE
BUN SERPL-MCNC: 14 MG/DL (ref 7–30)
CALCIUM SERPL-MCNC: 9 MG/DL (ref 8.5–10.1)
CHLORIDE BLD-SCNC: 105 MMOL/L (ref 94–109)
CO2 SERPL-SCNC: 28 MMOL/L (ref 20–32)
COLOR UR AUTO: YELLOW
CREAT SERPL-MCNC: 0.77 MG/DL (ref 0.52–1.04)
DIASTOLIC BLOOD PRESSURE - MUSE: NORMAL MMHG
EOSINOPHIL # BLD AUTO: 0.1 10E3/UL (ref 0–0.7)
EOSINOPHIL NFR BLD AUTO: 0 %
ERYTHROCYTE [DISTWIDTH] IN BLOOD BY AUTOMATED COUNT: 11.7 % (ref 10–15)
GFR SERPL CREATININE-BSD FRML MDRD: >90 ML/MIN/1.73M2
GLUCOSE BLD-MCNC: 104 MG/DL (ref 70–99)
GLUCOSE UR STRIP-MCNC: NEGATIVE MG/DL
HCG UR QL: NEGATIVE
HCT VFR BLD AUTO: 38.4 % (ref 35–47)
HGB BLD-MCNC: 13.2 G/DL (ref 11.7–15.7)
HGB UR QL STRIP: ABNORMAL
IMM GRANULOCYTES # BLD: 0 10E3/UL
IMM GRANULOCYTES NFR BLD: 0 %
INTERPRETATION ECG - MUSE: NORMAL
KETONES UR STRIP-MCNC: NEGATIVE MG/DL
LEUKOCYTE ESTERASE UR QL STRIP: ABNORMAL
LYMPHOCYTES # BLD AUTO: 1.8 10E3/UL (ref 0.8–5.3)
LYMPHOCYTES NFR BLD AUTO: 12 %
MCH RBC QN AUTO: 30.2 PG (ref 26.5–33)
MCHC RBC AUTO-ENTMCNC: 34.4 G/DL (ref 31.5–36.5)
MCV RBC AUTO: 88 FL (ref 78–100)
MONOCYTES # BLD AUTO: 0.8 10E3/UL (ref 0–1.3)
MONOCYTES NFR BLD AUTO: 6 %
NEUTROPHILS # BLD AUTO: 11.9 10E3/UL (ref 1.6–8.3)
NEUTROPHILS NFR BLD AUTO: 82 %
NITRATE UR QL: NEGATIVE
NRBC # BLD AUTO: 0 10E3/UL
NRBC BLD AUTO-RTO: 0 /100
P AXIS - MUSE: 13 DEGREES
PH UR STRIP: 6.5 [PH] (ref 5–7)
PLATELET # BLD AUTO: 181 10E3/UL (ref 150–450)
POTASSIUM BLD-SCNC: 3.6 MMOL/L (ref 3.4–5.3)
PR INTERVAL - MUSE: 150 MS
QRS DURATION - MUSE: 100 MS
QT - MUSE: 412 MS
QTC - MUSE: 425 MS
R AXIS - MUSE: 51 DEGREES
RBC # BLD AUTO: 4.37 10E6/UL (ref 3.8–5.2)
RBC URINE: >182 /HPF
SODIUM SERPL-SCNC: 138 MMOL/L (ref 133–144)
SP GR UR STRIP: 1.02 (ref 1–1.03)
SYSTOLIC BLOOD PRESSURE - MUSE: NORMAL MMHG
T AXIS - MUSE: 51 DEGREES
UROBILINOGEN UR STRIP-MCNC: NORMAL MG/DL
VENTRICULAR RATE- MUSE: 64 BPM
WBC # BLD AUTO: 14.6 10E3/UL (ref 4–11)
WBC URINE: 12 /HPF

## 2022-08-18 PROCEDURE — 81001 URINALYSIS AUTO W/SCOPE: CPT | Performed by: EMERGENCY MEDICINE

## 2022-08-18 PROCEDURE — 74176 CT ABD & PELVIS W/O CONTRAST: CPT

## 2022-08-18 PROCEDURE — 99285 EMERGENCY DEPT VISIT HI MDM: CPT | Mod: 25

## 2022-08-18 PROCEDURE — 36415 COLL VENOUS BLD VENIPUNCTURE: CPT | Performed by: EMERGENCY MEDICINE

## 2022-08-18 PROCEDURE — 87086 URINE CULTURE/COLONY COUNT: CPT | Performed by: EMERGENCY MEDICINE

## 2022-08-18 PROCEDURE — 96375 TX/PRO/DX INJ NEW DRUG ADDON: CPT

## 2022-08-18 PROCEDURE — 250N000013 HC RX MED GY IP 250 OP 250 PS 637: Performed by: EMERGENCY MEDICINE

## 2022-08-18 PROCEDURE — 93005 ELECTROCARDIOGRAM TRACING: CPT

## 2022-08-18 PROCEDURE — 80048 BASIC METABOLIC PNL TOTAL CA: CPT | Performed by: EMERGENCY MEDICINE

## 2022-08-18 PROCEDURE — 81025 URINE PREGNANCY TEST: CPT | Performed by: EMERGENCY MEDICINE

## 2022-08-18 PROCEDURE — 258N000003 HC RX IP 258 OP 636: Performed by: EMERGENCY MEDICINE

## 2022-08-18 PROCEDURE — 96365 THER/PROPH/DIAG IV INF INIT: CPT

## 2022-08-18 PROCEDURE — 85041 AUTOMATED RBC COUNT: CPT | Performed by: EMERGENCY MEDICINE

## 2022-08-18 PROCEDURE — 250N000011 HC RX IP 250 OP 636: Performed by: EMERGENCY MEDICINE

## 2022-08-18 PROCEDURE — 96372 THER/PROPH/DIAG INJ SC/IM: CPT | Mod: 59 | Performed by: EMERGENCY MEDICINE

## 2022-08-18 RX ORDER — SULFAMETHOXAZOLE/TRIMETHOPRIM 800-160 MG
1 TABLET ORAL 2 TIMES DAILY
Qty: 14 TABLET | Refills: 0 | Status: SHIPPED | OUTPATIENT
Start: 2022-08-18 | End: 2022-08-25

## 2022-08-18 RX ORDER — OXYCODONE HYDROCHLORIDE 5 MG/1
5 TABLET ORAL EVERY 6 HOURS PRN
Qty: 6 TABLET | Refills: 0 | Status: SHIPPED | OUTPATIENT
Start: 2022-08-18 | End: 2022-08-21

## 2022-08-18 RX ORDER — KETOROLAC TROMETHAMINE 15 MG/ML
30 INJECTION, SOLUTION INTRAMUSCULAR; INTRAVENOUS ONCE
Status: COMPLETED | OUTPATIENT
Start: 2022-08-18 | End: 2022-08-18

## 2022-08-18 RX ORDER — ONDANSETRON 4 MG/1
4 TABLET, ORALLY DISINTEGRATING ORAL EVERY 8 HOURS PRN
Qty: 10 TABLET | Refills: 0 | Status: SHIPPED | OUTPATIENT
Start: 2022-08-18 | End: 2022-08-21

## 2022-08-18 RX ORDER — OXYCODONE HYDROCHLORIDE 5 MG/1
5 TABLET ORAL ONCE
Status: COMPLETED | OUTPATIENT
Start: 2022-08-18 | End: 2022-08-18

## 2022-08-18 RX ORDER — ONDANSETRON 4 MG/1
4 TABLET, ORALLY DISINTEGRATING ORAL ONCE
Status: COMPLETED | OUTPATIENT
Start: 2022-08-18 | End: 2022-08-18

## 2022-08-18 RX ORDER — ACETAMINOPHEN 500 MG
1000 TABLET ORAL ONCE
Status: COMPLETED | OUTPATIENT
Start: 2022-08-18 | End: 2022-08-18

## 2022-08-18 RX ORDER — CEFTRIAXONE 1 G/1
1 INJECTION, POWDER, FOR SOLUTION INTRAMUSCULAR; INTRAVENOUS ONCE
Status: COMPLETED | OUTPATIENT
Start: 2022-08-18 | End: 2022-08-18

## 2022-08-18 RX ORDER — ONDANSETRON 2 MG/ML
4 INJECTION INTRAMUSCULAR; INTRAVENOUS ONCE
Status: COMPLETED | OUTPATIENT
Start: 2022-08-18 | End: 2022-08-18

## 2022-08-18 RX ADMIN — ONDANSETRON 4 MG: 4 TABLET, ORALLY DISINTEGRATING ORAL at 02:20

## 2022-08-18 RX ADMIN — OXYCODONE HYDROCHLORIDE 5 MG: 5 TABLET ORAL at 05:08

## 2022-08-18 RX ADMIN — CEFTRIAXONE SODIUM 1 G: 1 INJECTION, POWDER, FOR SOLUTION INTRAMUSCULAR; INTRAVENOUS at 05:06

## 2022-08-18 RX ADMIN — ONDANSETRON 4 MG: 2 INJECTION INTRAMUSCULAR; INTRAVENOUS at 03:40

## 2022-08-18 RX ADMIN — ACETAMINOPHEN 1000 MG: 500 TABLET, FILM COATED ORAL at 02:20

## 2022-08-18 RX ADMIN — SODIUM CHLORIDE 1000 ML: 9 INJECTION, SOLUTION INTRAVENOUS at 04:50

## 2022-08-18 RX ADMIN — KETOROLAC TROMETHAMINE 30 MG: 15 INJECTION, SOLUTION INTRAMUSCULAR; INTRAVENOUS at 02:20

## 2022-08-18 ASSESSMENT — ENCOUNTER SYMPTOMS
DIARRHEA: 1
LIGHT-HEADEDNESS: 1
VOMITING: 0
ABDOMINAL PAIN: 1
BACK PAIN: 1
NAUSEA: 1
HEMATURIA: 1

## 2022-08-18 ASSESSMENT — ACTIVITIES OF DAILY LIVING (ADL)
ADLS_ACUITY_SCORE: 35
ADLS_ACUITY_SCORE: 35

## 2022-08-18 NOTE — ED TRIAGE NOTES
Patient states 12 hours of right flank pain.  Blood in urine.  Pain worse over last hour.      Triage Assessment     Row Name 08/18/22 0154       Triage Assessment (Adult)    Airway WDL WDL       Respiratory WDL    Respiratory WDL WDL       Skin Circulation/Temperature WDL    Skin Circulation/Temperature WDL WDL       Cardiac WDL    Cardiac WDL WDL       Peripheral/Neurovascular WDL    Peripheral Neurovascular WDL WDL       Cognitive/Neuro/Behavioral WDL    Cognitive/Neuro/Behavioral WDL WDL

## 2022-08-18 NOTE — ED PROVIDER NOTES
"  History   Chief Complaint:  Hematuria       The history is provided by the patient.      Stefany Ricardo is a 32 year old female with history of UTI and hypertension who presents with hematuria. She started to feel a UTI a few days ago.12 hours ago she planned on going to the doctor later today but 1 hour ago the pain brought her to the ED. 4-5 hours ago the hematuria began. She had to lay on the floor due to being light-headed. No loss of consciousness. Diarrhea and nausea began 1 hour ago. The lower abdominal pain is radiating to her back. She is getting off her period now but notices the blood is in the urine. She had a low grade fever, since resolved. Denies concern for pregnancy. Denies history of hematuria with past UTI's.    Review of Systems   Constitutional: Negative for fever.   Respiratory: Negative for shortness of breath.    Cardiovascular: Negative for chest pain.   Gastrointestinal: Positive for abdominal pain, diarrhea and nausea. Negative for vomiting.   Genitourinary: Positive for hematuria.   Musculoskeletal: Positive for back pain.   Neurological: Positive for light-headedness. Negative for syncope.   All other systems reviewed and are negative.      Allergies:  Amoxicillin    Medications:  Wellbutrin    Past Medical History:       Hypertension  GDM, class A2  COVID-19 infection  PCOS  Anxiety  Chicken Pox  Pneumonia  UTI  Anemia  Migraines    Past Surgical History:    Foot surgery  Tooth extraction w/ forcep     Family History:    Father: thyroid disease, hypertension  Mother: RA, thyroid disease, hypertension    Social History:  The patient presents to the ED with   PCP: Yanet Can     Physical Exam     Patient Vitals for the past 24 hrs:   BP Temp Temp src Pulse Resp SpO2 Height Weight   22 0515 105/65 -- -- 63 18 100 % -- --   22 0445 94/52 98.2  F (36.8  C) Oral -- 16 99 % -- --   22 0156 115/53 98.1  F (36.7  C) Oral 57 14 98 % 1.626 m (5' 4\") 70.3 kg " (155 lb)       Physical Exam  General: Appears well-developed and well-nourished.   Head: No signs of trauma.   CV: Normal rate and regular rhythm.    Resp: Effort normal and breath sounds normal. No respiratory distress.   GI: Soft. There is suprapubic tenderness.  No rebound or guarding.  Normal bowel sounds.  No CVA tenderness.  MSK: Normal range of motion. no edema. No Calf tenderness.  Neuro: The patient is alert and oriented. Speech normal.  Skin: Skin is warm and dry. No rash noted.   Psych: normal mood and affect. behavior is normal.     Emergency Department Course   ECG  ECG taken at 0511, ECG read at 0514  Normal sinus rhythm.   Rate 64 bpm. KY interval 150 ms. QRS duration 100 ms. QT/QTc 412/425 ms. P-R-T axes 13 51 51.     Imaging:  CT Abdomen Pelvis w/o Contrast   Final Result   IMPRESSION:    1.  Several tiny calcified stones in both kidneys with diffusely increased density of the medullary pyramids suggestive of medullary nephrocalcinosis. No hydronephrosis or obstructing ureteral stone.      2.  Bladder wall thickening, accentuated by under distention, though underlying cystitis may be present.           Report per Radiology    Laboratory:  Labs Ordered and Resulted from Time of ED Arrival to Time of ED Departure   ROUTINE UA WITH MICROSCOPIC REFLEX TO CULTURE - Abnormal       Result Value    Color Urine Yellow      Appearance Urine Clear      Glucose Urine Negative      Bilirubin Urine Negative      Ketones Urine Negative      Specific Gravity Urine 1.022      Blood Urine Moderate (*)     pH Urine 6.5      Protein Albumin Urine 600  (*)     Urobilinogen Urine Normal      Nitrite Urine Negative      Leukocyte Esterase Urine Trace (*)     RBC Urine >182 (*)     WBC Urine 12 (*)    BASIC METABOLIC PANEL - Abnormal    Sodium 138      Potassium 3.6      Chloride 105      Carbon Dioxide (CO2) 28      Anion Gap 5      Urea Nitrogen 14      Creatinine 0.77      Calcium 9.0      Glucose 104 (*)     GFR  Estimate >90     CBC WITH PLATELETS AND DIFFERENTIAL - Abnormal    WBC Count 14.6 (*)     RBC Count 4.37      Hemoglobin 13.2      Hematocrit 38.4      MCV 88      MCH 30.2      MCHC 34.4      RDW 11.7      Platelet Count 181      % Neutrophils 82      % Lymphocytes 12      % Monocytes 6      % Eosinophils 0      % Basophils 0      % Immature Granulocytes 0      NRBCs per 100 WBC 0      Absolute Neutrophils 11.9 (*)     Absolute Lymphocytes 1.8      Absolute Monocytes 0.8      Absolute Eosinophils 0.1      Absolute Basophils 0.0      Absolute Immature Granulocytes 0.0      Absolute NRBCs 0.0     HCG QUALITATIVE URINE - Normal    hCG Urine Qualitative Negative     URINE CULTURE        Emergency Department Course:       Reviewed:  I reviewed nursing notes, vitals, past medical history and Care Everywhere    Assessments:  0205 I obtained history and examined the patient as noted above.  0319 I rechecked the patient.   0445 I rechecked the patient and explained findings.   0535 I rechecked the patient and set dispo to home.    Interventions:  0220 Toradol 30 mg IM  0220 Tylenol 1,000 mg Oral  0220 Zofran 4 mg Oral  0340 Zofran 4 mg IV  0450 NS 1L IV   0506 Rocephin 1 g IV  0508 Roxicodone 5 mg Oral    Disposition:  The patient was discharged to home.     Impression & Plan     Medical Decision Making:  Stefany Ricardo is a 32-year-old woman who presents due to urinary discomfort and urgency.  This evening she developed increased suprapubic pain and blood in her urine.  She states that the symptoms feel similar to prior urinary tract infections, but more intense.  Urine was obtained that was grossly bloody.  It did show a few white blood cells and small leukocyte esterase, and while this can fit with an infection, with the amount of blood that was noted, concern for the possibility of kidney stone.  Given this I did obtain a CT scan.  This did show multiple small stones in the kidneys, but no signs of ureteral stones.  CT  scan did show findings consistent with cystitis and given her prior history of believe this is likely the overall etiology.  She was given IV fluids and ceftriaxone.  She was given pain and nausea medication as well and did feel significantly better.  I did discuss disposition options with the patient, and she very much wanted to go home which I felt was overall reasonable as she was a young otherwise healthy individual.  Did recommend she push plenty of fluids and follow-up with her doctor in clinic.  She was given clear instructions to return for any worsening symptoms or further concerns.    Diagnosis:    ICD-10-CM    1. Acute cystitis with hematuria  N30.01        Discharge Medications:  Discharge Medication List as of 8/18/2022  5:47 AM      START taking these medications    Details   oxyCODONE (ROXICODONE) 5 MG tablet Take 1 tablet (5 mg) by mouth every 6 hours as needed for severe pain, Disp-6 tablet, R-0, E-Prescribe      sulfamethoxazole-trimethoprim (BACTRIM DS) 800-160 MG tablet Take 1 tablet by mouth 2 times daily for 7 days, Disp-14 tablet, R-0, E-Prescribe             Scribe Disclosure:  I, Eleni Sousa, am serving as a scribe at 2:04 AM on 8/18/2022 to document services personally performed by Jose Soler MD based on my observations and the provider's statements to me.            Jose Soler MD  08/19/22 [id number]

## 2022-08-19 LAB — BACTERIA UR CULT: ABNORMAL

## 2022-08-19 ASSESSMENT — ENCOUNTER SYMPTOMS
FEVER: 0
SHORTNESS OF BREATH: 0

## 2022-08-19 NOTE — RESULT ENCOUNTER NOTE
[facility] Emergency Dept discharge antibiotic (if prescribed): Sulfamethoxazole-Trimethoprim (Bactrim DS, Septra DS) 800-160 mg PO tablet,  1 tablet by mouth 2 times daily for 7 days.   Date of Rx (if applicable):  8/18/22  No changes in treatment per [facility] ED Lab Result Urine culture protocol.

## 2022-09-18 ENCOUNTER — HEALTH MAINTENANCE LETTER (OUTPATIENT)
Age: 32
End: 2022-09-18

## 2023-06-04 ENCOUNTER — HEALTH MAINTENANCE LETTER (OUTPATIENT)
Age: 33
End: 2023-06-04

## 2024-07-14 ENCOUNTER — HEALTH MAINTENANCE LETTER (OUTPATIENT)
Age: 34
End: 2024-07-14